# Patient Record
Sex: MALE | NOT HISPANIC OR LATINO | URBAN - METROPOLITAN AREA
[De-identification: names, ages, dates, MRNs, and addresses within clinical notes are randomized per-mention and may not be internally consistent; named-entity substitution may affect disease eponyms.]

---

## 2023-08-26 PROBLEM — L40.9 PSORIASIS: Status: ACTIVE | Noted: 2023-08-26

## 2023-08-26 PROBLEM — B34.2 CORONAVIRUS INFECTION: Status: ACTIVE | Noted: 2023-08-26

## 2023-08-26 PROBLEM — J96.00 ACUTE RESPIRATORY FAILURE (MULTI): Status: ACTIVE | Noted: 2023-08-26

## 2023-08-26 PROBLEM — Z86.711 HISTORY OF PULMONARY EMBOLISM: Status: ACTIVE | Noted: 2023-08-26

## 2023-08-26 PROBLEM — G47.00 INSOMNIA: Status: ACTIVE | Noted: 2023-08-26

## 2023-08-26 PROBLEM — I71.40 ABDOMINAL AORTIC ANEURYSM (CMS-HCC): Status: ACTIVE | Noted: 2023-08-26

## 2023-08-26 PROBLEM — M47.816 OSTEOARTHRITIS OF LUMBAR SPINE: Status: ACTIVE | Noted: 2023-08-26

## 2023-08-26 PROBLEM — E86.0 DEHYDRATION: Status: ACTIVE | Noted: 2023-08-26

## 2023-08-26 PROBLEM — E11.9 TYPE 2 DIABETES MELLITUS WITHOUT COMPLICATION, WITHOUT LONG-TERM CURRENT USE OF INSULIN (MULTI): Status: ACTIVE | Noted: 2023-08-26

## 2023-08-26 PROBLEM — M79.676 PAIN IN TOE: Status: ACTIVE | Noted: 2023-08-26

## 2023-08-26 PROBLEM — J18.9 PNEUMONIA: Status: ACTIVE | Noted: 2023-08-26

## 2023-08-26 PROBLEM — I49.9 IRREGULAR HEARTBEAT: Status: ACTIVE | Noted: 2023-08-26

## 2023-08-26 PROBLEM — R51.9 HEADACHE: Status: ACTIVE | Noted: 2023-08-26

## 2023-08-26 PROBLEM — R04.2 HEMOPTYSIS: Status: ACTIVE | Noted: 2023-08-26

## 2023-08-26 PROBLEM — R06.00 DYSPNEA: Status: ACTIVE | Noted: 2023-08-26

## 2023-08-26 PROBLEM — M48.061 FORAMINAL STENOSIS OF LUMBAR REGION: Status: ACTIVE | Noted: 2023-08-26

## 2023-08-26 PROBLEM — R73.09 BLOOD GLUCOSE ABNORMAL: Status: ACTIVE | Noted: 2023-08-26

## 2023-08-26 PROBLEM — I48.0 PAROXYSMAL ATRIAL FIBRILLATION (MULTI): Status: ACTIVE | Noted: 2023-08-26

## 2023-08-26 PROBLEM — R09.02 HYPOXIA: Status: ACTIVE | Noted: 2023-08-26

## 2023-08-26 PROBLEM — L08.9 INFECTION OF TOE: Status: ACTIVE | Noted: 2023-08-26

## 2023-08-26 PROBLEM — S39.012A BACK STRAIN: Status: ACTIVE | Noted: 2023-08-26

## 2023-08-26 RX ORDER — HYDROCODONE BITARTRATE AND ACETAMINOPHEN 5; 325 MG/1; MG/1
TABLET ORAL
COMMUNITY
Start: 2023-07-14

## 2023-08-26 RX ORDER — ASPIRIN 325 MG
TABLET, DELAYED RELEASE (ENTERIC COATED) ORAL
COMMUNITY

## 2023-10-27 ENCOUNTER — APPOINTMENT (OUTPATIENT)
Dept: PRIMARY CARE | Facility: CLINIC | Age: 73
End: 2023-10-27
Payer: MEDICARE

## 2023-11-13 ENCOUNTER — TELEPHONE (OUTPATIENT)
Dept: PRIMARY CARE | Facility: CLINIC | Age: 73
End: 2023-11-13
Payer: MEDICARE

## 2023-11-13 DIAGNOSIS — E11.9 TYPE 2 DIABETES MELLITUS WITHOUT COMPLICATION, WITHOUT LONG-TERM CURRENT USE OF INSULIN (MULTI): Primary | ICD-10-CM

## 2023-11-13 RX ORDER — BLOOD-GLUCOSE METER
EACH MISCELLANEOUS
COMMUNITY
Start: 2023-03-17

## 2023-11-13 RX ORDER — BLOOD SUGAR DIAGNOSTIC
STRIP MISCELLANEOUS
Qty: 100 EACH | Refills: 3 | Status: SHIPPED | OUTPATIENT
Start: 2023-11-13

## 2023-11-13 RX ORDER — FLUOCINONIDE 0.5 MG/G
CREAM TOPICAL
COMMUNITY
Start: 2019-03-06

## 2023-11-13 RX ORDER — BLOOD SUGAR DIAGNOSTIC
STRIP MISCELLANEOUS
COMMUNITY
Start: 2023-10-12 | End: 2023-11-13 | Stop reason: SDUPTHER

## 2023-12-20 ENCOUNTER — TELEPHONE (OUTPATIENT)
Dept: PRIMARY CARE | Facility: CLINIC | Age: 73
End: 2023-12-20
Payer: MEDICARE

## 2023-12-20 DIAGNOSIS — M48.061 FORAMINAL STENOSIS OF LUMBAR REGION: Primary | ICD-10-CM

## 2024-10-22 ENCOUNTER — TELEPHONE (OUTPATIENT)
Dept: PRIMARY CARE | Facility: CLINIC | Age: 74
End: 2024-10-22
Payer: MEDICARE

## 2024-10-22 NOTE — TELEPHONE ENCOUNTER
Pt is requesting Lump Left side below ear at the corner of the jaw bone been there since May, Please Advise  [phone].

## 2024-11-02 PROBLEM — L08.9 INFECTION OF TOE: Status: RESOLVED | Noted: 2023-08-26 | Resolved: 2024-11-02

## 2024-11-02 PROBLEM — M86.8X7 OSTEOMYELITIS OF FOREFOOT (MULTI): Status: RESOLVED | Noted: 2023-07-12 | Resolved: 2024-11-02

## 2024-11-02 PROBLEM — B34.2 CORONAVIRUS INFECTION: Status: RESOLVED | Noted: 2023-08-26 | Resolved: 2024-11-02

## 2024-11-02 PROBLEM — R06.00 DYSPNEA: Status: RESOLVED | Noted: 2023-08-26 | Resolved: 2024-11-02

## 2024-11-02 PROBLEM — J96.00 ACUTE RESPIRATORY FAILURE: Status: RESOLVED | Noted: 2023-08-26 | Resolved: 2024-11-02

## 2024-11-02 PROBLEM — I49.9 IRREGULAR HEARTBEAT: Status: RESOLVED | Noted: 2023-08-26 | Resolved: 2024-11-02

## 2024-11-02 PROBLEM — J18.9 PNEUMONIA: Status: RESOLVED | Noted: 2023-08-26 | Resolved: 2024-11-02

## 2024-11-02 PROBLEM — R73.09 BLOOD GLUCOSE ABNORMAL: Status: RESOLVED | Noted: 2023-08-26 | Resolved: 2024-11-02

## 2024-11-02 PROBLEM — R51.9 HEADACHE: Status: RESOLVED | Noted: 2023-08-26 | Resolved: 2024-11-02

## 2024-11-02 PROBLEM — R04.2 HEMOPTYSIS: Status: RESOLVED | Noted: 2023-08-26 | Resolved: 2024-11-02

## 2024-11-02 PROBLEM — S39.012A BACK STRAIN: Status: RESOLVED | Noted: 2023-08-26 | Resolved: 2024-11-02

## 2024-11-02 PROBLEM — E86.0 DEHYDRATION: Status: RESOLVED | Noted: 2023-08-26 | Resolved: 2024-11-02

## 2024-11-02 PROBLEM — M79.676 PAIN IN TOE: Status: RESOLVED | Noted: 2023-08-26 | Resolved: 2024-11-02

## 2024-11-02 PROBLEM — R09.02 HYPOXIA: Status: RESOLVED | Noted: 2023-08-26 | Resolved: 2024-11-02

## 2024-11-05 ENCOUNTER — OFFICE VISIT (OUTPATIENT)
Dept: PRIMARY CARE | Facility: CLINIC | Age: 74
End: 2024-11-05
Payer: MEDICARE

## 2024-11-05 VITALS
BODY MASS INDEX: 24.32 KG/M2 | TEMPERATURE: 96.5 F | DIASTOLIC BLOOD PRESSURE: 78 MMHG | WEIGHT: 164.7 LBS | RESPIRATION RATE: 18 BRPM | SYSTOLIC BLOOD PRESSURE: 108 MMHG | OXYGEN SATURATION: 97 % | HEART RATE: 80 BPM

## 2024-11-05 DIAGNOSIS — K11.8 MASS OF LEFT PAROTID GLAND: Primary | ICD-10-CM

## 2024-11-05 PROCEDURE — 99213 OFFICE O/P EST LOW 20 MIN: CPT | Performed by: FAMILY MEDICINE

## 2024-11-05 PROCEDURE — 3074F SYST BP LT 130 MM HG: CPT | Performed by: FAMILY MEDICINE

## 2024-11-05 PROCEDURE — 1159F MED LIST DOCD IN RCRD: CPT | Performed by: FAMILY MEDICINE

## 2024-11-05 PROCEDURE — G2211 COMPLEX E/M VISIT ADD ON: HCPCS | Performed by: FAMILY MEDICINE

## 2024-11-05 PROCEDURE — 1036F TOBACCO NON-USER: CPT | Performed by: FAMILY MEDICINE

## 2024-11-05 PROCEDURE — 1160F RVW MEDS BY RX/DR IN RCRD: CPT | Performed by: FAMILY MEDICINE

## 2024-11-05 PROCEDURE — 3078F DIAST BP <80 MM HG: CPT | Performed by: FAMILY MEDICINE

## 2024-11-05 PROCEDURE — 1124F ACP DISCUSS-NO DSCNMKR DOCD: CPT | Performed by: FAMILY MEDICINE

## 2024-11-05 ASSESSMENT — LIFESTYLE VARIABLES
HOW OFTEN DO YOU HAVE A DRINK CONTAINING ALCOHOL: NEVER
SKIP TO QUESTIONS 9-10: 1
HOW MANY STANDARD DRINKS CONTAINING ALCOHOL DO YOU HAVE ON A TYPICAL DAY: PATIENT DOES NOT DRINK
HOW OFTEN DO YOU HAVE SIX OR MORE DRINKS ON ONE OCCASION: NEVER
AUDIT-C TOTAL SCORE: 0

## 2024-11-05 ASSESSMENT — ENCOUNTER SYMPTOMS
DEPRESSION: 0
OCCASIONAL FEELINGS OF UNSTEADINESS: 0
LOSS OF SENSATION IN FEET: 0

## 2024-11-05 ASSESSMENT — COLUMBIA-SUICIDE SEVERITY RATING SCALE - C-SSRS
6. HAVE YOU EVER DONE ANYTHING, STARTED TO DO ANYTHING, OR PREPARED TO DO ANYTHING TO END YOUR LIFE?: NO
1. IN THE PAST MONTH, HAVE YOU WISHED YOU WERE DEAD OR WISHED YOU COULD GO TO SLEEP AND NOT WAKE UP?: NO
2. HAVE YOU ACTUALLY HAD ANY THOUGHTS OF KILLING YOURSELF?: NO

## 2024-11-05 ASSESSMENT — PATIENT HEALTH QUESTIONNAIRE - PHQ9
SUM OF ALL RESPONSES TO PHQ9 QUESTIONS 1 & 2: 0
1. LITTLE INTEREST OR PLEASURE IN DOING THINGS: NOT AT ALL
2. FEELING DOWN, DEPRESSED OR HOPELESS: NOT AT ALL

## 2024-11-05 NOTE — PATIENT INSTRUCTIONS
Mass in left parotid gland - discussed causes.  Concern for cyst vs mass.  Recommend ultrasound of parotid.  Further testing depends on result.  Will likely need ENT referral after testing.

## 2024-11-05 NOTE — PROGRESS NOTES
Subjective   Patient ID: Clyde Sampson Jr. is a 74 y.o. male who presents for lump left neck/ear.    Patient is here for lump on left side of face.  Woke up with lump.  Present since May 2025.  Unchanged.  No pain.  No hearing loss.  No pain with salivating.  No trouble swallowing.         Review of Systems    Objective   /78   Pulse 80   Temp 35.8 °C (96.5 °F) (Temporal)   Resp 18   Wt 74.7 kg (164 lb 11.2 oz)   SpO2 97%   PF 80 L/min   BMI 24.32 kg/m²     Physical Exam  Vitals reviewed.   Constitutional:       General: He is not in acute distress.  HENT:      Head:      Salivary Glands: Left salivary gland is diffusely enlarged. Left salivary gland is not tender.      Right Ear: Tympanic membrane and ear canal normal.      Left Ear: Tympanic membrane and ear canal normal.      Nose: No rhinorrhea.      Mouth/Throat:      Pharynx: Oropharynx is clear. Uvula midline. No posterior oropharyngeal erythema.   Cardiovascular:      Rate and Rhythm: Normal rate and regular rhythm.   Pulmonary:      Effort: Pulmonary effort is normal.      Breath sounds: No wheezing or rhonchi.   Musculoskeletal:      Right lower leg: No edema.      Left lower leg: No edema.   Lymphadenopathy:      Cervical: No cervical adenopathy.   Neurological:      Mental Status: He is alert.         Assessment/Plan   Diagnoses and all orders for this visit:  Mass of left parotid gland  -     US neck parotid; Future    Patient Instructions   Mass in left parotid gland - discussed causes.  Concern for cyst vs mass.  Recommend ultrasound of parotid.  Further testing depends on result.  Will likely need ENT referral after testing.

## 2024-11-07 ENCOUNTER — HOSPITAL ENCOUNTER (OUTPATIENT)
Dept: RADIOLOGY | Facility: HOSPITAL | Age: 74
Discharge: HOME | End: 2024-11-07
Payer: MEDICARE

## 2024-11-07 DIAGNOSIS — K11.8 MASS OF LEFT PAROTID GLAND: ICD-10-CM

## 2024-11-07 PROCEDURE — 76536 US EXAM OF HEAD AND NECK: CPT

## 2024-11-07 PROCEDURE — 76536 US EXAM OF HEAD AND NECK: CPT | Performed by: RADIOLOGY

## 2024-11-08 ENCOUNTER — TELEPHONE (OUTPATIENT)
Dept: PRIMARY CARE | Facility: CLINIC | Age: 74
End: 2024-11-08
Payer: MEDICARE

## 2024-11-08 DIAGNOSIS — K11.8 MASS OF LEFT PAROTID GLAND: Primary | ICD-10-CM

## 2024-11-08 NOTE — TELEPHONE ENCOUNTER
Please contact patient about US results. Had it done yesterday and was told he would hear from Dr. Corbin today.  Explained that Dr. Corbin is on vacation.  Thank you

## 2024-11-11 NOTE — TELEPHONE ENCOUNTER
Spoke with patient about results.  Patient is going to call back for referral information later today.

## 2024-11-11 NOTE — TELEPHONE ENCOUNTER
On US patient has a solid large left parotid mass.  Pt needs to see ENT.  Please call Dr. Lynch office to help arrange follow up.  Pt will need biopsy and likely removal.

## 2024-11-11 NOTE — TELEPHONE ENCOUNTER
Clyde called to schedule the referral with Dr Lynch and was told he cannot be seen until 2/12/25 but that they are going to try as hard as they can to get him in sooner.

## 2024-11-12 ENCOUNTER — TELEPHONE (OUTPATIENT)
Dept: PRIMARY CARE | Facility: CLINIC | Age: 74
End: 2024-11-12
Payer: MEDICARE

## 2024-11-12 NOTE — TELEPHONE ENCOUNTER
Dr. Steinberg is not able to see him until 02/12/25 and he is concerned about the lump on his face. He said if you can refer him to someone that can see him sooner he would appreciate that.

## 2024-11-12 NOTE — TELEPHONE ENCOUNTER
Spoke with Sandra and she has patient rescheduled for December 4. She had notified the patient already.  MILY

## 2024-12-04 ENCOUNTER — LAB (OUTPATIENT)
Dept: LAB | Facility: LAB | Age: 74
End: 2024-12-04
Payer: MEDICARE

## 2024-12-04 ENCOUNTER — APPOINTMENT (OUTPATIENT)
Dept: OTOLARYNGOLOGY | Facility: CLINIC | Age: 74
End: 2024-12-04
Payer: MEDICARE

## 2024-12-04 VITALS — WEIGHT: 175 LBS | BODY MASS INDEX: 25.92 KG/M2 | HEIGHT: 69 IN

## 2024-12-04 DIAGNOSIS — K11.8 MASS OF LEFT PAROTID GLAND: Primary | ICD-10-CM

## 2024-12-04 DIAGNOSIS — Z00.00 HEALTH CARE MAINTENANCE: ICD-10-CM

## 2024-12-04 DIAGNOSIS — R22.1 NECK MASS: ICD-10-CM

## 2024-12-04 LAB
CREAT SERPL-MCNC: 1.01 MG/DL (ref 0.5–1.3)
EGFRCR SERPLBLD CKD-EPI 2021: 79 ML/MIN/1.73M*2

## 2024-12-04 PROCEDURE — 88112 CYTOPATH CELL ENHANCE TECH: CPT

## 2024-12-04 PROCEDURE — 1160F RVW MEDS BY RX/DR IN RCRD: CPT | Performed by: OTOLARYNGOLOGY

## 2024-12-04 PROCEDURE — 1036F TOBACCO NON-USER: CPT | Performed by: OTOLARYNGOLOGY

## 2024-12-04 PROCEDURE — 88173 CYTOPATH EVAL FNA REPORT: CPT

## 2024-12-04 PROCEDURE — 3008F BODY MASS INDEX DOCD: CPT | Performed by: OTOLARYNGOLOGY

## 2024-12-04 PROCEDURE — 10021 FNA BX W/O IMG GDN 1ST LES: CPT | Performed by: OTOLARYNGOLOGY

## 2024-12-04 PROCEDURE — 1159F MED LIST DOCD IN RCRD: CPT | Performed by: OTOLARYNGOLOGY

## 2024-12-04 PROCEDURE — 31575 DIAGNOSTIC LARYNGOSCOPY: CPT | Performed by: OTOLARYNGOLOGY

## 2024-12-04 PROCEDURE — 82565 ASSAY OF CREATININE: CPT

## 2024-12-04 PROCEDURE — 88173 CYTOPATH EVAL FNA REPORT: CPT | Performed by: PATHOLOGY

## 2024-12-04 PROCEDURE — 36415 COLL VENOUS BLD VENIPUNCTURE: CPT

## 2024-12-04 PROCEDURE — 99203 OFFICE O/P NEW LOW 30 MIN: CPT | Performed by: OTOLARYNGOLOGY

## 2024-12-04 NOTE — PROGRESS NOTES
"HPI  Clyde Sampson Jr. is a 74 y.o. male kindly referred by Dr. Corbin for ration of left parotid mass.  Patient noticed about May and it is slowly grown since.  Ultrasound with about a 4 cm mass in the inferior parotid gland.  Vascular nature.  It is not painful.  He does not have other neck masses.  He has not had biopsy.      Past Medical History:   Diagnosis Date    Osteomyelitis of forefoot (Multi) 07/12/2023            Medications:     Current Outpatient Medications:     Accu-Chek Guide Glucose Meter misc, USE AS DIRECTED TO TEST TWICE DAILY, Disp: , Rfl:     Accu-Chek Guide test strips strip, USE TO TEST TWICE DAILY AS DIRECTED, Disp: 100 each, Rfl: 3    aspirin 325 mg EC tablet, 1 tablet Orally twice daily, Disp: , Rfl:      Allergies:  Allergies   Allergen Reactions    Penicillin V Hives    Latex, Natural Rubber Rash    Penicillin Rash        Physical Exam:  Last Recorded Vitals  Height 1.753 m (5' 9\"), weight 79.4 kg (175 lb).  General:     General appearance: Well-developed, well-nourished in no acute distress.       Voice:  normal       Head/face: Normal appearance; nontender to palpation     Facial nerve function: Normal and symmetric bilaterally.    Oral/oropharynx:     Oral vestibule: Normal labial and gingival mucosa     Tongue/floor of mouth: Normal without lesion     Oropharynx: Clear.  No lesions present of the hard/soft palate, posterior pharynx    Neck:     Neck: Normal appearance, trachea midline     Salivary glands: Normal to palpation right.  Left side with a 3 cm inferior posterior mass, mobile, rubbery, no skin changes     Lymph nodes: No cervical lymphadenopathy to palpation     Thyroid: No thyromegaly.  No palpable nodules     Range of motion: Normal    Neurological:     Cortical functions: Alert and oriented x3, appropriate affect       Larynx/hypopharynx:     Laryngeal findings: Mirror exam inadequate or limited secondary to enlarged base of tongue and/or excessive gagging.  Flexible " laryngoscopy performed secondary to inadequate mirror examination.  Verbal informed consent the flexible laryngoscope was passed through the nasal cavity.  Normal epiglottis, aryepiglottic folds, arytenoids, false vocal cords, true vocal cords.  Normal vocal cord mobility bilaterally was identified.  No mucosal masses or lesions.    Nasopharynx:     Nasopharynx: Normal    Ear:     Ear canal: Normal bilaterally after cleaning cerumen impaction bilaterally with wire-loop.     Tympanic membrane: Intact and mobile bilaterally     Pinna: Normal bilaterally     Hearing:  Gross hearing assessment normal by voice    Nose:     Visualized using: Anterior rhinoscopy     Nasopharynx: Inadequate mirror exam secondary to gag, anatomy.       Nasal dorsum: Nontraumatic midline appearance     Septum: Midline     Inferior turbinates: Normally sized     Mucosa: Bilateral, pink, normal appearing       ASSESSMENT/PLAN:  Left parotid mass.  FNA performed today x 2 with verbal informed consent.  Tolerated well.  CT neck ordered.  Follow-up after        Randy Lynch MD

## 2024-12-09 LAB
LABORATORY COMMENT REPORT: NORMAL
LABORATORY COMMENT REPORT: NORMAL
PATH REPORT.FINAL DX SPEC: NORMAL
PATH REPORT.GROSS SPEC: NORMAL
PATH REPORT.RELEVANT HX SPEC: NORMAL
PATH REPORT.TOTAL CANCER: NORMAL

## 2024-12-16 ENCOUNTER — APPOINTMENT (OUTPATIENT)
Dept: OTOLARYNGOLOGY | Facility: CLINIC | Age: 74
End: 2024-12-16
Payer: MEDICARE

## 2024-12-19 ENCOUNTER — HOSPITAL ENCOUNTER (OUTPATIENT)
Dept: RADIOLOGY | Facility: HOSPITAL | Age: 74
Discharge: HOME | End: 2024-12-19
Payer: MEDICARE

## 2024-12-19 DIAGNOSIS — K11.8 MASS OF LEFT PAROTID GLAND: ICD-10-CM

## 2024-12-19 PROCEDURE — 70491 CT SOFT TISSUE NECK W/DYE: CPT | Performed by: RADIOLOGY

## 2024-12-19 PROCEDURE — 70491 CT SOFT TISSUE NECK W/DYE: CPT

## 2024-12-19 PROCEDURE — 2550000001 HC RX 255 CONTRASTS: Performed by: OTOLARYNGOLOGY

## 2024-12-20 ENCOUNTER — APPOINTMENT (OUTPATIENT)
Dept: OTOLARYNGOLOGY | Facility: CLINIC | Age: 74
End: 2024-12-20
Payer: MEDICARE

## 2024-12-20 VITALS — TEMPERATURE: 97.5 F | BODY MASS INDEX: 25.8 KG/M2 | HEIGHT: 69 IN | WEIGHT: 174.2 LBS

## 2024-12-20 DIAGNOSIS — R22.1 NECK MASS: ICD-10-CM

## 2024-12-20 DIAGNOSIS — K11.8 MASS OF LEFT PAROTID GLAND: Primary | ICD-10-CM

## 2024-12-20 PROCEDURE — 99214 OFFICE O/P EST MOD 30 MIN: CPT | Performed by: OTOLARYNGOLOGY

## 2024-12-20 PROCEDURE — 1036F TOBACCO NON-USER: CPT | Performed by: OTOLARYNGOLOGY

## 2024-12-20 PROCEDURE — 3008F BODY MASS INDEX DOCD: CPT | Performed by: OTOLARYNGOLOGY

## 2024-12-20 PROCEDURE — 1159F MED LIST DOCD IN RCRD: CPT | Performed by: OTOLARYNGOLOGY

## 2024-12-20 NOTE — PROGRESS NOTES
"HPI  Clyde Sampson Jr. is a 74 y.o. male follow-up left parotid mass.  CT scan with what appears to be mass that extends into the deep lobe and medial to the ramus of the mandible.  FNA consistent with Warthin tumor    Past Medical History:   Diagnosis Date    Osteomyelitis of forefoot (Multi) 07/12/2023            Medications:     Current Outpatient Medications:     Accu-Chek Guide Glucose Meter misc, USE AS DIRECTED TO TEST TWICE DAILY, Disp: , Rfl:     Accu-Chek Guide test strips strip, USE TO TEST TWICE DAILY AS DIRECTED, Disp: 100 each, Rfl: 3    aspirin 325 mg EC tablet, 1 tablet Orally twice daily, Disp: , Rfl:      Allergies:  Allergies   Allergen Reactions    Penicillin V Hives    Latex, Natural Rubber Rash    Penicillin Rash        Physical Exam:  Last Recorded Vitals  Temperature 36.4 °C (97.5 °F), height 1.753 m (5' 9\"), weight 79 kg (174 lb 3.2 oz).  General:     General appearance: Well-developed, well-nourished in no acute distress.       Voice:  normal       Head/face: Normal appearance; nontender to palpation     Facial nerve function: Normal and symmetric bilaterally.    Oral/oropharynx:     Oral vestibule: Normal labial and gingival mucosa     Tongue/floor of mouth: Normal without lesion     Oropharynx: Clear.  No lesions present of the hard/soft palate, posterior pharynx    Neck:     Neck: Normal appearance, trachea midline     Salivary glands: Normal to palpation right.  Left side again with a 3 cm inferior posterior mass, mobile, rubbery, no skin changes     Lymph nodes: No cervical lymphadenopathy to palpation     Thyroid: No thyromegaly.  No palpable nodules     Range of motion: Normal    Neurological:     Cortical functions: Alert and oriented x3, appropriate affect       Larynx/hypopharynx:     Laryngeal findings: Mirror exam inadequate or limited secondary to enlarged base of tongue and/or excessive gagging.     Ear:     Ear canal: Normal bilaterally     Tympanic membrane: Intact and " mobile bilaterally     Pinna: Normal bilaterally     Hearing:  Gross hearing assessment normal by voice    Nose:     Visualized using: Anterior rhinoscopy     Nasopharynx: Inadequate mirror exam secondary to gag, anatomy.       Nasal dorsum: Nontraumatic midline appearance     Septum: Midline     Inferior turbinates: Normally sized     Mucosa: Bilateral, pink, normal appearing       ASSESSMENT/PLAN:  Left parotid mass.  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  We discussed the size and location of this and I have referred him for surgical opinion regarding removal from my head/neck colleagues.  I am happy to see him back at any point.      Randy Lynch MD

## 2024-12-23 ENCOUNTER — TELEPHONE (OUTPATIENT)
Dept: PRIMARY CARE | Facility: CLINIC | Age: 74
End: 2024-12-23
Payer: MEDICARE

## 2024-12-23 ENCOUNTER — TELEPHONE (OUTPATIENT)
Dept: OTOLARYNGOLOGY | Facility: CLINIC | Age: 74
End: 2024-12-23
Payer: MEDICARE

## 2024-12-23 DIAGNOSIS — E11.9 TYPE 2 DIABETES MELLITUS WITHOUT COMPLICATION, WITHOUT LONG-TERM CURRENT USE OF INSULIN (MULTI): ICD-10-CM

## 2024-12-23 RX ORDER — BLOOD SUGAR DIAGNOSTIC
STRIP MISCELLANEOUS
Qty: 100 EACH | Refills: 3 | Status: SHIPPED | OUTPATIENT
Start: 2024-12-23

## 2024-12-23 NOTE — TELEPHONE ENCOUNTER
Patient was last treated in the office on Friday for a left mass parotid mass and review CT scan/FNA results. He called today in regards to being referred for surgery, he did mention he would like to stay in [facility] if possible. He asked for a call back to discuss further at [phone], thank you.

## 2024-12-23 NOTE — TELEPHONE ENCOUNTER
Refill Request:    Accu-Chek Guide test strips strip USE TO TEST TWICE DAILY AS DIRECTED     Qty # 100    Pharmacy: [facility]

## 2025-01-06 ENCOUNTER — APPOINTMENT (OUTPATIENT)
Dept: OTOLARYNGOLOGY | Facility: CLINIC | Age: 75
End: 2025-01-06
Payer: MEDICARE

## 2025-01-06 VITALS — TEMPERATURE: 97.4 F | HEIGHT: 69 IN | BODY MASS INDEX: 25.77 KG/M2 | WEIGHT: 174 LBS

## 2025-01-06 DIAGNOSIS — K11.8 MASS OF BOTH PAROTID GLANDS: ICD-10-CM

## 2025-01-06 DIAGNOSIS — D11.9 WARTHIN TUMOR: Primary | ICD-10-CM

## 2025-01-06 PROCEDURE — 1036F TOBACCO NON-USER: CPT | Performed by: STUDENT IN AN ORGANIZED HEALTH CARE EDUCATION/TRAINING PROGRAM

## 2025-01-06 PROCEDURE — 3008F BODY MASS INDEX DOCD: CPT | Performed by: STUDENT IN AN ORGANIZED HEALTH CARE EDUCATION/TRAINING PROGRAM

## 2025-01-06 PROCEDURE — 99204 OFFICE O/P NEW MOD 45 MIN: CPT | Performed by: STUDENT IN AN ORGANIZED HEALTH CARE EDUCATION/TRAINING PROGRAM

## 2025-01-06 PROCEDURE — 1159F MED LIST DOCD IN RCRD: CPT | Performed by: STUDENT IN AN ORGANIZED HEALTH CARE EDUCATION/TRAINING PROGRAM

## 2025-01-06 PROCEDURE — 1126F AMNT PAIN NOTED NONE PRSNT: CPT | Performed by: STUDENT IN AN ORGANIZED HEALTH CARE EDUCATION/TRAINING PROGRAM

## 2025-01-06 ASSESSMENT — PAIN SCALES - GENERAL: PAINLEVEL_OUTOF10: 0-NO PAIN

## 2025-01-06 NOTE — PROGRESS NOTES
"HEAD AND NECK SURGERY CONSULT  [facility]      HPI  I had the pleasure of seeing Clyde WILLARD Camilla Montoya as a consultation today for evaluation of parotid lesion.     The patient reports a history of noticing enlargement of his left neck in April or May of last year. It has not grown since then. No pain or tenderness. No numbness or changes in facial movements. A biopsy was done and came back as warthins tumor. He would like to have this removed.    Denies history of skin cancers or lesions. History of smoking.    The patient denies having prior trauma or surgery to their head and neck. There is not a personal or family history of blood clots, easy bleeding or bruising, or anesthesia concerns.      Tobacco use: The patient  reports that he quit smoking about 5 years ago. His smoking use included cigarettes. He has never used smokeless tobacco.   Alcohol Use: The patient  reports that he does not currently use alcohol.     Physical Examination  Vitals:  Temp 36.3 °C (97.4 °F) (Tympanic)   Ht 1.753 m (5' 9\")   Wt 78.9 kg (174 lb)   BMI 25.70 kg/m²   General: Examination reveals a well-developed, well-nourished patient in no apparent distress.  The patient has no audible dysphonia, stridor or airway distress.  The patient is oriented, alert and responsive.    Face: symmetric movement, no lesions  Oral Cavity:  The patient is able to open the mouth widely without trismus.  The floor of mouth and oral tongue are soft, and no mucosal abnormalities are noted on the lips or within the oral cavity.  The oral tongue is fully mobile and midline on protrusion.  The patient's teeth are poor with multiple missing  Oropharynx: There are no mucosal abnormalities noted within the oropharynx.  The soft palate elevates symmetrically, the tonsils and base of tongue are normal to inspection and palpation.       Salivary glands: Palpable, mobile left mass at tail of parotid. No other masses palpable  Neuro: Cranial nerves " 2-12 are without obvious abnormality.  Neck: The neck is soft and symmetric.  There is no palpable adenopathy.      DATA REVIEWED:  Labs:  Lab Results   Component Value Date    WBC 9.9 05/31/2023    HGB 16.7 (H) 05/31/2023    HCT 48.3 05/31/2023     05/31/2023    NEUTROABS 7.59 05/31/2023     Lab Results   Component Value Date    TSH 1.76 10/24/2021    HGBA1C 6.3 (H) 07/12/2023        Radiology: I personally reviewed the CT neck from 12/19/24 which showed a mass of the left parotid gland measuring greater than 4 cm, additional smaller 1 cm lesion in the right parotid gland, no cervical LAD    Review of prior medical records: I reviewed the patient's medical records which included a clinic note from Randy Lynch MD (ENT) from 12/20/24 in which he detailed work up of left parotid mass found to be a warthins tumor and referral to Head and neck to consider surgical excision.    Pathology: I reviewed the pathology report from the biopsy 12/4/24 which demonstrated FNA of left neck mass, consistent with warthins tumor     ASSESSMENT and PLAN:    Bilateral parotid mass,   - Discussed the larger left parotid mass biopsy consistent with warthins tumor. Suspect the smaller right parotid mass is also a warthins  - Reviewed options for next steps for the left warthins tumor including observation v surgical excision  - Discussed parotidectomy risks and benefits at length including pain, bleeding, infection, scar, hematoma or seroma, need for further procedures, damage to surrounding structures including the facial nerve, first bite syndrome, Soanm's syndrome and risks of anesthesia.   - Patient desires surgical excision, discussed possible OR dates/locations and he does not want to come downtown to [facility] where I operate. Will reach out [place] one of [place]     Fabby Mullen MD

## 2025-01-31 ENCOUNTER — APPOINTMENT (OUTPATIENT)
Dept: OTOLARYNGOLOGY | Facility: CLINIC | Age: 75
End: 2025-01-31
Payer: MEDICARE

## 2025-02-07 ENCOUNTER — OFFICE VISIT (OUTPATIENT)
Dept: PRIMARY CARE | Facility: CLINIC | Age: 75
End: 2025-02-07
Payer: MEDICARE

## 2025-02-07 VITALS
OXYGEN SATURATION: 98 % | TEMPERATURE: 96.9 F | SYSTOLIC BLOOD PRESSURE: 156 MMHG | WEIGHT: 173.4 LBS | DIASTOLIC BLOOD PRESSURE: 86 MMHG | RESPIRATION RATE: 18 BRPM | BODY MASS INDEX: 25.61 KG/M2 | HEART RATE: 68 BPM

## 2025-02-07 DIAGNOSIS — D11.9 WARTHIN TUMOR: Primary | ICD-10-CM

## 2025-02-07 DIAGNOSIS — I48.0 PAROXYSMAL ATRIAL FIBRILLATION (MULTI): ICD-10-CM

## 2025-02-07 DIAGNOSIS — E11.9 TYPE 2 DIABETES MELLITUS WITHOUT COMPLICATION, WITHOUT LONG-TERM CURRENT USE OF INSULIN (MULTI): ICD-10-CM

## 2025-02-07 LAB — POC HEMOGLOBIN A1C: 5.8 % (ref 4.2–6.5)

## 2025-02-07 PROCEDURE — 99214 OFFICE O/P EST MOD 30 MIN: CPT | Performed by: FAMILY MEDICINE

## 2025-02-07 PROCEDURE — 83036 HEMOGLOBIN GLYCOSYLATED A1C: CPT | Mod: QW | Performed by: FAMILY MEDICINE

## 2025-02-07 PROCEDURE — 99214 OFFICE O/P EST MOD 30 MIN: CPT | Mod: 25 | Performed by: FAMILY MEDICINE

## 2025-02-07 PROCEDURE — 1125F AMNT PAIN NOTED PAIN PRSNT: CPT | Performed by: FAMILY MEDICINE

## 2025-02-07 PROCEDURE — 3077F SYST BP >= 140 MM HG: CPT | Performed by: FAMILY MEDICINE

## 2025-02-07 PROCEDURE — 1160F RVW MEDS BY RX/DR IN RCRD: CPT | Performed by: FAMILY MEDICINE

## 2025-02-07 PROCEDURE — 1159F MED LIST DOCD IN RCRD: CPT | Performed by: FAMILY MEDICINE

## 2025-02-07 PROCEDURE — 3079F DIAST BP 80-89 MM HG: CPT | Performed by: FAMILY MEDICINE

## 2025-02-07 ASSESSMENT — ENCOUNTER SYMPTOMS
OCCASIONAL FEELINGS OF UNSTEADINESS: 0
DEPRESSION: 0
LOSS OF SENSATION IN FEET: 0

## 2025-02-07 ASSESSMENT — LIFESTYLE VARIABLES
SKIP TO QUESTIONS 9-10: 1
HOW OFTEN DO YOU HAVE SIX OR MORE DRINKS ON ONE OCCASION: NEVER
HOW MANY STANDARD DRINKS CONTAINING ALCOHOL DO YOU HAVE ON A TYPICAL DAY: PATIENT DOES NOT DRINK
AUDIT-C TOTAL SCORE: 0
HOW OFTEN DO YOU HAVE A DRINK CONTAINING ALCOHOL: NEVER

## 2025-02-07 ASSESSMENT — PATIENT HEALTH QUESTIONNAIRE - PHQ9
1. LITTLE INTEREST OR PLEASURE IN DOING THINGS: NOT AT ALL
2. FEELING DOWN, DEPRESSED OR HOPELESS: NOT AT ALL
SUM OF ALL RESPONSES TO PHQ9 QUESTIONS 1 & 2: 0

## 2025-02-07 ASSESSMENT — PAIN SCALES - GENERAL: PAINLEVEL_OUTOF10: 5

## 2025-02-07 NOTE — PROGRESS NOTES
Subjective   Patient ID: Clyde Sampson Jr. is a 74 y.o. male who presents for Diabetes.    For DM2:  -A1c: 5.8  -Follow up: diet controlled.  Feeling well.    -Hypoglycemic Episodes: none  -Glucose monitoring:  not checking    Parotid mass  -Pt saw ENT - pt had biopsy - positive for warthins tumor 12/2024.  Saw Dr. Stern - offered surgery.  Pt does not want to do [facility] - too far.  Patient was referred to Dr. Galvan at  [facility].    -Specialist: Dr. Lynch    Atrial Fibrillation  -F/U: stable, no issues - no palpitations, CP or SOB  -Anticoagulation: pt refused anticoagulation  -Cardiologist:  none      Diabetes         Review of Systems    Objective   /86   Pulse 68   Temp 36.1 °C (96.9 °F) (Temporal)   Resp 18   Wt 78.7 kg (173 lb 6.4 oz)   SpO2 98%   BMI 25.61 kg/m²     Physical Exam  Vitals reviewed.   Constitutional:       General: He is not in acute distress.  HENT:      Head:      Salivary Glands: Left salivary gland is diffusely enlarged. Left salivary gland is not tender.      Right Ear: Tympanic membrane and ear canal normal.      Left Ear: Tympanic membrane and ear canal normal.      Nose: No rhinorrhea.      Mouth/Throat:      Pharynx: Oropharynx is clear. Uvula midline. No posterior oropharyngeal erythema.   Cardiovascular:      Rate and Rhythm: Normal rate and regular rhythm.   Pulmonary:      Effort: Pulmonary effort is normal.      Breath sounds: No wheezing or rhonchi.   Musculoskeletal:      Right lower leg: No edema.      Left lower leg: No edema.   Lymphadenopathy:      Cervical: No cervical adenopathy.   Neurological:      Mental Status: He is alert.         Assessment/Plan   Diagnoses and all orders for this visit:  Warthin tumor  Type 2 diabetes mellitus without complication, without long-term current use of insulin (Multi)  -     POCT glycosylated hemoglobin (Hb A1C) manually resulted  Paroxysmal atrial fibrillation (Multi)    .  Patient Instructions   Here for follow up.       For Diabetes - A1c is 5.8 - well controlled.  Diet controlled.     For mass on parotid - pt has warthrins tumor - slow growing benign mass.  Recommend seeing Dr. Durand to discuss surgical opinion.      For afib - stable - no issues.      Recheck in 6 months.  If you need seen sooner for surgical clearance please call

## 2025-02-07 NOTE — PATIENT INSTRUCTIONS
Here for follow up.      For Diabetes - A1c is 5.8 - well controlled.  Diet controlled.     For mass on parotid - pt has warthrins tumor - slow growing benign mass.  Recommend seeing Dr. Durand to discuss surgical opinion.      For afib - stable - no issues.      Recheck in 6 months.  If you need seen sooner for surgical clearance please call

## 2025-02-12 ENCOUNTER — APPOINTMENT (OUTPATIENT)
Dept: OTOLARYNGOLOGY | Facility: CLINIC | Age: 75
End: 2025-02-12
Payer: MEDICARE

## 2025-02-21 ENCOUNTER — APPOINTMENT (OUTPATIENT)
Dept: OTOLARYNGOLOGY | Facility: CLINIC | Age: 75
End: 2025-02-21
Payer: MEDICARE

## 2025-02-21 VITALS — BODY MASS INDEX: 25.62 KG/M2 | HEIGHT: 69 IN | WEIGHT: 173 LBS

## 2025-02-21 DIAGNOSIS — K11.8 MASS OF LEFT PAROTID GLAND: ICD-10-CM

## 2025-02-21 PROCEDURE — 1159F MED LIST DOCD IN RCRD: CPT | Performed by: OTOLARYNGOLOGY

## 2025-02-21 PROCEDURE — 3008F BODY MASS INDEX DOCD: CPT | Performed by: OTOLARYNGOLOGY

## 2025-02-21 PROCEDURE — 1160F RVW MEDS BY RX/DR IN RCRD: CPT | Performed by: OTOLARYNGOLOGY

## 2025-02-21 PROCEDURE — 99214 OFFICE O/P EST MOD 30 MIN: CPT | Performed by: OTOLARYNGOLOGY

## 2025-02-21 ASSESSMENT — PATIENT HEALTH QUESTIONNAIRE - PHQ9
1. LITTLE INTEREST OR PLEASURE IN DOING THINGS: NOT AT ALL
2. FEELING DOWN, DEPRESSED OR HOPELESS: NOT AT ALL
SUM OF ALL RESPONSES TO PHQ9 QUESTIONS 1 AND 2: 0

## 2025-02-21 NOTE — PROGRESS NOTES
Subjective   Patient ID: Clyde Sampson Jr. is a 74 y.o. male who presents for New Patient Visit (Parotid gland ).    The patient reports a history of noticing enlargement of his left neck in April or May of last year. It has not grown since then. No pain or tenderness. No numbness or changes in facial movements. A biopsy was done and came back as warthins tumor. He would like to have this removed.     Previously saw  January 2025 for left parotid. At that visit, discussed biopsy consistent with Warthin's Tumor. Discussed parotidectomy, which is desired treatment plan by patient. However, patient does not want to go to [facility] where  operates because patient reports it is too far from his home.     Tobacco use: The patient  reports that he quit smoking about 5 years ago. His smoking use included cigarettes. He has never used smokeless tobacco.     Objective   General: Examination reveals a well-developed, well-nourished patient in no apparent distress.  The patient has no audible dysphonia, stridor or airway distress.  The patient is oriented, alert and responsive.    Face: symmetric movement, no lesions  Oral Cavity:  The patient is able to open the mouth widely without trismus.  The floor of mouth and oral tongue are soft, and no mucosal abnormalities are noted on the lips or within the oral cavity.  The oral tongue is fully mobile and midline on protrusion.  The patient's teeth are poor with multiple missing  Oropharynx: There are no mucosal abnormalities noted within the oropharynx.  The soft palate elevates symmetrically, the tonsils and base of tongue are normal to inspection and palpation.       Salivary glands: Palpable, mobile left mass at tail of parotid. No other masses palpable  Neck: The neck is soft and symmetric.  There is no palpable adenopathy.     Assessment/Plan   Discussed diagnosis and indication for left parotidectomy. Patient accepting of surgical treatment plan  at [facility].  reviewed risks including anesthesia, facial nerve weakness, bleeding, possible need for overnight stay, and possible surgical drain placement.    If possible, patient would like to avoid an overnight stay due to history of hospital delirium.      Next step will be planning OR date with [facility] for left parotidectomy. Discussed need to monitor right sided parotid as well, and if expanding over time, may require parotidectomy on right side in future.     Jacob Gibson DMD, MD 02/21/25 12:45 PM       The above resident note has been reviewed and confirmed.  Patient was seen and examined with the resident today.  Documentation has been reviewed.          Left parotidectomy  Lengthy and detailed discussion was held with patient regarding parotidectomy including risk benefits and alternatives  including risks to the facial nerve was discussed. This includes partial or complete, temporary or permanent facial paralysis and the implications of each. They understand surgical incisions, numbness of the region, disfigurement, sialocele, salivary fistula, Sonam's syndrome and perioperative risks given any associated medical comorbidities. Length of surgery, expected outcomes have all reviewed in detail option reviewed.  Verbal consent was obtained

## 2025-02-24 ENCOUNTER — TELEPHONE (OUTPATIENT)
Dept: OTOLARYNGOLOGY | Facility: HOSPITAL | Age: 75
End: 2025-02-24
Payer: MEDICARE

## 2025-03-11 ENCOUNTER — CLINICAL SUPPORT (OUTPATIENT)
Dept: PREADMISSION TESTING | Facility: HOSPITAL | Age: 75
End: 2025-03-11
Payer: MEDICARE

## 2025-03-11 DIAGNOSIS — K11.8 MASS OF LEFT PAROTID GLAND: ICD-10-CM

## 2025-03-11 NOTE — CPM/PAT NURSE NOTE
"CPM/PAT Nurse Note      Name: Clyde Sampson JrPavan (Clyde Sampson )  /Age: 1950/74 y.o.       Past Medical History:   Diagnosis Date    Mass of left parotid gland     Plan: Left Parotid Gland Excision 3/27/25    Osteomyelitis of great toe of right foot (Multi)     S/P AMPUTATION TOE METATARSOPHALANGEAL JOINT 23    Overweight     PAF (paroxysmal atrial fibrillation) (Multi)     Per PCP note: \"stable, no issues - no palpitations, CP or SOB -Anticoagulation: pt refused anticoagulation -Cardiologist: none\"    Type 2 diabetes mellitus     23 A1C 6.3%       Past Surgical History:   Procedure Laterality Date    AMPUTATION FOOT / TOE  2023    AMPUTATION TOE METATARSOPHALANGEAL JOINT    CHOLECYSTECTOMY      CT ABDOMEN PELVIS ANGIOGRAM W AND/OR WO IV CONTRAST  10/06/2021    CT ABDOMEN PELVIS ANGIOGRAM W AND/OR WO IV CONTRAST [facility]    OTHER SURGICAL HISTORY  2023    TRANSESOPHOGEAL ECHO       Patient Sexual activity questions deferred to the physician.    Family History   Problem Relation Name Age of Onset    Cancer Mother         Allergies   Allergen Reactions    Penicillin V Hives    Latex, Natural Rubber Rash    Penicillin Rash       Prior to Admission medications    Medication Sig Start Date End Date Taking? Authorizing Provider   Accu-Chek Guide Glucose Meter misc USE AS DIRECTED TO TEST TWICE DAILY 3/17/23   Historical Provider, MD   Accu-Chek Guide test strips strip USE TO TEST TWICE DAILY AS DIRECTED 24   Curry Corbni, DO   aspirin 325 mg EC tablet 1 tablet Orally twice daily    Historical Provider, MD   CINNAMON BARK ORAL Take by mouth once daily.    Historical Provider, MD MARSHALL ROS     DASI Risk Score    No data to display       Caprini DVT Assessment    No data to display       Modified Frailty Index    No data to display       [id number] Stroke Risk Points  Current as of just now        3 0 to 9 Points:      Last Change:           The " [id number] risk score (Ray CARLOS et al. 2009. © 2010 American College [employer]) quantifies the risk of stroke for a patient with atrial fibrillation. For patients without atrial fibrillation or under the age of 18 this score appears as N/A. Higher score values generally indicate higher risk of stroke.          Points Metrics   0 Has Congestive Heart Failure:  No     Patients with congestive heart failure get 1 point.    Current as of just now   0 Has Hypertension:  No     Patients with hypertension get 1 point.    Current as of just now   1 Age:  74     Patients 65 to 74 years old get 1 point, or patients 75 years and older get 2 points.    Current as of just now   1 Has Diabetes:  Yes     Patients with diabetes get 1 point.    Current as of just now   0 Had Stroke:  No  Had TIA:  No  Had Thromboembolism:  No     Patients who have had a stroke, TIA, or thromboembolism get 2 points.    Current as of just now   1 Has Vascular Disease:  Yes     Patients with vascular disease get 1 point.    Current as of just now   0 Clinically Relevant Sex:  Male     Patients with a clinically relevant sex of Female get 1 point.    Current as of just now             Revised Cardiac Risk Index    No data to display       Apfel Simplified Score    No data to display       Risk Analysis Index Results This Encounter    No data found in the last 10 encounters.       Prodigy: High Risk  Total Score: 20              Prodigy Age Score      Prodigy Gender Score          ARISCAT Score for Postoperative Pulmonary Complications    No data to display       Jean Perioperative Risk for Myocardial Infarction or Cardiac Arrest (TAY)    No data to display         Nurse Plan of Action: RN screening call complete.  Reviewed allergies, medications and pharmacy, medical, surgical and social history with patient.  Chart updated.

## 2025-03-17 ENCOUNTER — PRE-ADMISSION TESTING (OUTPATIENT)
Dept: PREADMISSION TESTING | Facility: HOSPITAL | Age: 75
End: 2025-03-17
Payer: MEDICARE

## 2025-03-17 ENCOUNTER — LAB (OUTPATIENT)
Dept: LAB | Facility: HOSPITAL | Age: 75
End: 2025-03-17
Payer: MEDICARE

## 2025-03-17 VITALS
HEIGHT: 69 IN | BODY MASS INDEX: 24.52 KG/M2 | HEART RATE: 77 BPM | OXYGEN SATURATION: 97 % | DIASTOLIC BLOOD PRESSURE: 88 MMHG | WEIGHT: 165.57 LBS | TEMPERATURE: 97.6 F | SYSTOLIC BLOOD PRESSURE: 140 MMHG | RESPIRATION RATE: 20 BRPM

## 2025-03-17 DIAGNOSIS — Z01.818 PREOP TESTING: Primary | ICD-10-CM

## 2025-03-17 DIAGNOSIS — Z01.818 ENCOUNTER FOR OTHER PREPROCEDURAL EXAMINATION: Primary | ICD-10-CM

## 2025-03-17 LAB
ALBUMIN SERPL BCP-MCNC: 4.7 G/DL (ref 3.4–5)
ALP SERPL-CCNC: 75 U/L (ref 33–136)
ALT SERPL W P-5'-P-CCNC: 13 U/L (ref 10–52)
ANION GAP SERPL CALC-SCNC: 12 MMOL/L (ref 10–20)
AST SERPL W P-5'-P-CCNC: 14 U/L (ref 9–39)
BASOPHILS # BLD AUTO: 0.08 X10*3/UL (ref 0–0.1)
BASOPHILS NFR BLD AUTO: 0.8 %
BILIRUB SERPL-MCNC: 0.5 MG/DL (ref 0–1.2)
BUN SERPL-MCNC: 18 MG/DL (ref 6–23)
CALCIUM SERPL-MCNC: 10 MG/DL (ref 8.6–10.3)
CHLORIDE SERPL-SCNC: 101 MMOL/L (ref 98–107)
CO2 SERPL-SCNC: 31 MMOL/L (ref 21–32)
CREAT SERPL-MCNC: 1.04 MG/DL (ref 0.5–1.3)
EGFRCR SERPLBLD CKD-EPI 2021: 76 ML/MIN/1.73M*2
EOSINOPHIL # BLD AUTO: 0.07 X10*3/UL (ref 0–0.4)
EOSINOPHIL NFR BLD AUTO: 0.7 %
ERYTHROCYTE [DISTWIDTH] IN BLOOD BY AUTOMATED COUNT: 13.3 % (ref 11.5–14.5)
GLUCOSE SERPL-MCNC: 100 MG/DL (ref 74–99)
HCT VFR BLD AUTO: 52.2 % (ref 41–52)
HGB BLD-MCNC: 17.9 G/DL (ref 13.5–17.5)
IMM GRANULOCYTES # BLD AUTO: 0.07 X10*3/UL (ref 0–0.5)
IMM GRANULOCYTES NFR BLD AUTO: 0.7 % (ref 0–0.9)
LYMPHOCYTES # BLD AUTO: 1.48 X10*3/UL (ref 0.8–3)
LYMPHOCYTES NFR BLD AUTO: 15 %
MCH RBC QN AUTO: 30.6 PG (ref 26–34)
MCHC RBC AUTO-ENTMCNC: 34.3 G/DL (ref 32–36)
MCV RBC AUTO: 89 FL (ref 80–100)
MONOCYTES # BLD AUTO: 0.5 X10*3/UL (ref 0.05–0.8)
MONOCYTES NFR BLD AUTO: 5.1 %
NEUTROPHILS # BLD AUTO: 7.68 X10*3/UL (ref 1.6–5.5)
NEUTROPHILS NFR BLD AUTO: 77.7 %
NRBC BLD-RTO: 0 /100 WBCS (ref 0–0)
PLATELET # BLD AUTO: 258 X10*3/UL (ref 150–450)
POTASSIUM SERPL-SCNC: 4.7 MMOL/L (ref 3.5–5.3)
PROT SERPL-MCNC: 7.7 G/DL (ref 6.4–8.2)
RBC # BLD AUTO: 5.85 X10*6/UL (ref 4.5–5.9)
SODIUM SERPL-SCNC: 139 MMOL/L (ref 136–145)
WBC # BLD AUTO: 9.9 X10*3/UL (ref 4.4–11.3)

## 2025-03-17 PROCEDURE — 85025 COMPLETE CBC W/AUTO DIFF WBC: CPT

## 2025-03-17 PROCEDURE — 93005 ELECTROCARDIOGRAM TRACING: CPT | Performed by: PHYSICIAN ASSISTANT

## 2025-03-17 PROCEDURE — 80053 COMPREHEN METABOLIC PANEL: CPT

## 2025-03-17 PROCEDURE — 99204 OFFICE O/P NEW MOD 45 MIN: CPT | Performed by: PHYSICIAN ASSISTANT

## 2025-03-17 ASSESSMENT — ENCOUNTER SYMPTOMS
SHORTNESS OF BREATH: 0
EYE PAIN: 0
VOMITING: 0
NUMBNESS: 0
ARTHRALGIAS: 0
NECK STIFFNESS: 0
BLOOD IN STOOL: 0
COUGH: 0
CONSTIPATION: 0
DYSPNEA AT REST: 0
VISUAL CHANGE: 0
UNEXPECTED WEIGHT CHANGE: 0
PALPITATIONS: 0
TROUBLE SWALLOWING: 0
EYE DISCHARGE: 0
WOUND: 0
DOUBLE VISION: 0
MYALGIAS: 0
CHILLS: 0
WHEEZING: 0
DYSPNEA WITH EXERTION: 0
WEAKNESS: 0
DIFFICULTY URINATING: 0
CONFUSION: 0
RHINORRHEA: 0
ABDOMINAL DISTENTION: 0
NECK PAIN: 0
NECK SWELLING: 1
DIARRHEA: 0
NAUSEA: 0
SINUS CONGESTION: 0
HEMOPTYSIS: 0
FEVER: 0
DYSURIA: 0
BRUISES/BLEEDS EASILY: 0
EXCESSIVE BLEEDING: 0
LIGHT-HEADEDNESS: 0
LIMITED RANGE OF MOTION: 0
ABDOMINAL PAIN: 0
SKIN CHANGES: 0

## 2025-03-17 NOTE — H&P (VIEW-ONLY)
"[facility]/PAT Evaluation       Name: Clyde Sampson Jr. (Clyde Sampson Jr.)  /Age: 1950/74 y.o.       Date of Consult: 3/17/25    Referring Provider: Dr. Durand    Surgery, Date, and Length:     Left Parotid Gland Excision - Left   3/27/25, 150MIN    Clyde Sampson Jr. is a 74 y.o. year-old male who presents to the [facility] for perioperative risk assessment prior to surgery.    Patient presents with a primary diagnosis of left parotid mass. Pt first noticed mass in May 2024. Pt denies any pain and no discharge from the affected area.  Pt does mention he notices more saliva production, especially at night time.  Pt wishes to proceed with surgery as planned.     This note was created in part upon personal review of patient's medical records.      Patient is scheduled to have Left Parotid Gland Excision - Left      Pt denies any past history of anesthetic complications such as PONV, awareness, prolonged sedation, dental damage, aspiration, cardiac arrest, difficult intubation, difficult I.V. access or unexpected hospital admissions.  NO malignant hyperthermia and or pseudocholinesterase deficiency.  No history of blood transfusions     The patient is not a Confucianism and will accept blood and blood products if medically indicated.   Type and screen NOT sent.     Past Medical History:   Diagnosis Date    Mass of left parotid gland     Plan: Left Parotid Gland Excision 3/27/25    Osteomyelitis of great toe of right foot (Multi)     S/P AMPUTATION TOE METATARSOPHALANGEAL JOINT 23    Overweight     PAF (paroxysmal atrial fibrillation) (Multi)     Per PCP note: \"stable, no issues - no palpitations, CP or SOB -Anticoagulation: pt refused anticoagulation -Cardiologist: none\"    Type 2 diabetes mellitus     23 A1C 6.3%       Past Surgical History:   Procedure Laterality Date    AMPUTATION FOOT / TOE  2023    AMPUTATION TOE METATARSOPHALANGEAL JOINT    CARDIOVERSION  2023    " CHOLECYSTECTOMY      CT ABDOMEN PELVIS ANGIOGRAM W AND/OR WO IV CONTRAST  10/06/2021    CT ABDOMEN PELVIS ANGIOGRAM W AND/OR WO IV CONTRAST [facility]    OTHER SURGICAL HISTORY  07/13/2023    TRANSESOPHOGEAL ECHO       Patient Sexual activity questions deferred to the physician.    Family History   Problem Relation Name Age of Onset    Cancer Mother         Allergies   Allergen Reactions    Penicillin V Hives    Latex, Natural Rubber Rash    Penicillin Rash       Current Outpatient Medications   Medication Instructions    Accu-Chek Guide Glucose Meter misc USE AS DIRECTED TO TEST TWICE DAILY    Accu-Chek Guide test strips strip USE TO TEST TWICE DAILY AS DIRECTED    APPLE CIDER VINEGAR ORAL Take by mouth.    aspirin 325 mg EC tablet 1 tablet Orally twice daily    CINNAMON BARK ORAL Daily           PAT ROS:   Constitutional:    no fever   no chills   no unexpected weight change  Neuro/Psych:    no numbness   no weakness   no light-headedness   no confusion  Eyes:    no discharge   no pain   no vision loss   no diplopia   no visual disturbance  Ears:    no ear pain   no hearing loss   no tinnitus  Nose:    no nasal discharge   no sinus congestion   no epistaxis  Mouth:    no dental issues   no mouth pain   no oral bleeding   no mouth lesions  Throat:    no throat pain   no dysphagia  Neck:    Left parotid enlargement; palpable masses just adjacent to SCM; size of dried apricot   no neck pain   neck swelling (left parotid gland  swellling)   no neck stiffness  Cardio:    Functional 4 Mets. Patient denies SOB walking up 2 flights of stairs   Walking, cooking, cleaning, grocery shopping   no chest pain   no palpitations   no peripheral edema   no dyspnea   no CHAMBERS  Respiratory:    no cough   no wheezing   no hemoptysis   no shortness of breath  Endocrine:    no cold intolerance   no heat intolerance  GI:    no abdominal distention   no abdominal pain   no constipation   no diarrhea   no nausea   no vomiting   no  blood in stool  :    no difficulty urinating   no dysuria   no oliguria  Musculoskeletal:    no arthralgias   no myalgias   no decreased ROM  Hematologic:    does not bruise/bleed easily   no excessive bleeding   no history of blood transfusion   no blood clots  Skin:   no skin changes   no sores/wound   no rash      Physical Exam  Constitutional:       General: He is not in acute distress.     Appearance: Normal appearance. He is not ill-appearing, toxic-appearing or diaphoretic.   HENT:      Head: Normocephalic and atraumatic.      Nose: Nose normal. No congestion or rhinorrhea.      Mouth/Throat:      Mouth: Mucous membranes are moist.      Pharynx: No posterior oropharyngeal erythema.   Eyes:      Extraocular Movements: Extraocular movements intact.      Conjunctiva/sclera: Conjunctivae normal.   Neck:      Comments: Left parotid enlargement; palpable masses just adjacent to SCM; size of dried apricot  Cardiovascular:      Rate and Rhythm: Normal rate and regular rhythm.      Pulses: Normal pulses.      Heart sounds: Normal heart sounds. No murmur heard.     No friction rub. No gallop.   Pulmonary:      Effort: Pulmonary effort is normal. No respiratory distress.      Breath sounds: Normal breath sounds. No stridor. No wheezing, rhonchi or rales.   Abdominal:      General: Bowel sounds are normal. There is no distension.      Palpations: Abdomen is soft. There is no mass.      Tenderness: There is no abdominal tenderness. There is no guarding or rebound.      Hernia: No hernia is present.   Musculoskeletal:         General: No swelling, tenderness, deformity or signs of injury. Normal range of motion.      Cervical back: Normal range of motion and neck supple. No rigidity or tenderness.   Skin:     General: Skin is warm and dry.      Coloration: Skin is not jaundiced or pale.      Findings: No bruising, erythema, lesion or rash.   Neurological:      General: No focal deficit present.      Mental Status: He is  "alert and oriented to person, place, and time.      Cranial Nerves: No cranial nerve deficit.      Sensory: No sensory deficit.      Motor: No weakness.      Coordination: Coordination normal.   Psychiatric:         Mood and Affect: Mood normal.         Behavior: Behavior normal.          PAT AIRWAY:   Airway:     Mallampati::  III    Neck ROM::  Full   Many missing teeth; only 2 teeth on top    Visit Vitals  /88   Pulse 77   Temp 36.4 °C (97.6 °F)   Resp 20   Ht 1.753 m (5' 9\")   Wt 75.1 kg (165 lb 9.1 oz)   SpO2 97%   BMI 24.45 kg/m²   Smoking Status Former   BSA 1.91 m²         LABS:  Lab Results   Component Value Date    HGBA1C 5.8 02/07/2025      Lab Results   Component Value Date    WBC 9.9 03/17/2025    HGB 17.9 (H) 03/17/2025    HCT 52.2 (H) 03/17/2025    MCV 89 03/17/2025     03/17/2025      Lab Results   Component Value Date    GLUCOSE 100 (H) 03/17/2025    CALCIUM 10.0 03/17/2025     03/17/2025    K 4.7 03/17/2025    CO2 31 03/17/2025     03/17/2025    BUN 18 03/17/2025    CREATININE 1.04 03/17/2025        EKG 3/17/25  A fib  Nonspecific ST and T wave abnormality, probably digitalis effect  Abnormal EKG   Vent rate = 91 bpm      ECHO 7/13/23  CONCLUSIONS:   - Exam indication: Pre Cardioversion, Pre AF Ablation   - The left ventricle is dilated. Left ventricular systolic function is mildly   decreased. EF = 45 ± 5% (visual est.)   - The right ventricle is dilated. Right ventricular systolic function is low   normal.   - The left atrial cavity is dilated. There is no thrombus in the left atrium or   its appendage.   - The right atrial cavity is dilated.   - There are no significant valvular abnormalities.   - There is moderate (2+) mitral valve regurgitation due to apical tethering of   normal mitral leaflet caused by LV enlargement.   - There is moderate (2+) tricuspid valve regurgitation caused by annular   dilatation.   - The patient has not had a prior CC echocardiographic exam " for comparison.       Assessment and Plan:     74 y.o.  male  scheduled for Left Parotid Gland Excision - Left on 3/27/25 with Dr. Durand for  left parotid mass.   Presents to [facility] today for perioperative risk stratification and optimization      Cardiovascular:  Patient has no active cardiac symptoms.   Patient denies any chest pain, tightness, heaviness, pressure, radiating pain, palpitations, irregular heartbeats, lightheadedness, cough, congestion, shortness of breath, CHAMBERS, PND, near syncope, weight loss or gain.    METS: 4+  RCRI: 0 points, 3.9%  risk for postoperative MACE     Afib - pt declines anticoagulant therapy; hold ASA 325mg 7 days before surgery (will take ASA 81mg while holding); EKG today shows A fib    Elevated BP - BP today is quite elevated at 145/125    Pulmonary:  No pulmonary diagnosis, however patient is at increased risk of perioperative complications secondary to  age > 60, duration of surgery > 2 hours  Stop Bang score is 2 placing patient at low risk for STEPH  ARISCAT: <26 points, 1.6% risk of in-hospital postoperative pulmonary complication  PRODIGY: High risk for opioid induced respiratory depression    **Pt provided with deep breathing exercises, incentive spirometer and instructions for use during PAT visit today**    PE - 10/2021 - unprovoked; no current anticoagulants    Endocrine:  DMII - diet controlled   A1c 2/7/25 = 5.8%     Neuro:  No neurologic diagnosis, however, the patient is at increased risk for perioperative delirium secondary to  age, type and duration of surgery, Patient instructed on and provided cognitive exercises    Hematology:  Elevated H/H  5/31/23 H/H 16.7/48.3%  7/12/23 H/H 16.4/48.5%  3/17/25 H/H 17.9/52.2%    Patient instructed to ambulate as soon as possible postoperatively to decrease thromboembolic risk.   Initiate mechanical DVT prophylaxis as soon as possible and initiate chemical prophylaxis when deemed safe from a bleeding standpoint post surgery.      LABS: CBC, CMP and EKG ordered    Followup: Labs pending    Addendum 3/18/25:  Lab results reviewed and show elevated H/H which is essentially stable    Caprini: 7    Risk assessment complete.  Patient is scheduled for a low to intermediate surgical risk procedure.        Preoperative medication instructions were provided and reviewed with the patient.  Any additional testing or evaluation was explained to the patient.  Nothing by mouth instructions were discussed and patient's questions were answered prior to conclusion to this encounter.  Patient verbalized understanding of preoperative instructions given in preadmission testing; discharge instructions available in EMR.    This note was dictated by a speech recognition.  Minor errors may have been detected in a speech recognition.

## 2025-03-17 NOTE — CPM/PAT NURSE NOTE
"CPM/PAT Nurse Note      Name: Clyde Sampson  (Clyde Sampson )  /Age: 1950/74 y.o.       Past Medical History:   Diagnosis Date    Mass of left parotid gland     Plan: Left Parotid Gland Excision 3/27/25    Osteomyelitis of great toe of right foot (Multi)     S/P AMPUTATION TOE METATARSOPHALANGEAL JOINT 23    Overweight     PAF (paroxysmal atrial fibrillation) (Multi)     Per PCP note: \"stable, no issues - no palpitations, CP or SOB -Anticoagulation: pt refused anticoagulation -Cardiologist: none\"    Pulmonary emboli     Type 2 diabetes mellitus     23 A1C 6.3%       Past Surgical History:   Procedure Laterality Date    AMPUTATION FOOT / TOE  2023    AMPUTATION TOE METATARSOPHALANGEAL JOINT    CARDIOVERSION  2023    CHOLECYSTECTOMY      CT ABDOMEN PELVIS ANGIOGRAM W AND/OR WO IV CONTRAST  10/06/2021    CT ABDOMEN PELVIS ANGIOGRAM W AND/OR WO IV CONTRAST [facility]    OTHER SURGICAL HISTORY  2023    TRANSESOPHOGEAL ECHO       Patient Sexual activity questions deferred to the physician.    Family History   Problem Relation Name Age of Onset    Cancer Mother         Allergies   Allergen Reactions    Penicillin V Hives    Latex, Natural Rubber Rash    Penicillin Rash       Prior to Admission medications    Medication Sig Start Date End Date Taking? Authorizing Provider   Accu-Chek Guide Glucose Meter misc USE AS DIRECTED TO TEST TWICE DAILY 3/17/23  Yes Historical Provider, MD   Accu-Chek Guide test strips strip USE TO TEST TWICE DAILY AS DIRECTED 24  Yes Curry Corbin,    APPLE CIDER VINEGAR ORAL Take by mouth.   Yes Historical Provider, MD   aspirin 325 mg EC tablet 1 tablet Orally twice daily   Yes Historical Provider, MD   CINNAMON BARK ORAL Take by mouth once daily.   Yes Historical Provider, MD JOANNA ALONSO     DASI Risk Score    No data to display       Caprini DVT Assessment    No data to display       Modified Frailty Index    No data to " display       [id number] Stroke Risk Points  Current as of 14 minutes ago        3 0 to 9 Points:      Last Change:           The [id number] risk score (Lip BREANNA, et al. 2009. © 2010 American College [employer]) quantifies the risk of stroke for a patient with atrial fibrillation. For patients without atrial fibrillation or under the age of 18 this score appears as N/A. Higher score values generally indicate higher risk of stroke.          Points Metrics   0 Has Congestive Heart Failure:  No     Patients with congestive heart failure get 1 point.    Current as of 14 minutes ago   0 Has Hypertension:  No     Patients with hypertension get 1 point.    Current as of 14 minutes ago   1 Age:  74     Patients 65 to 74 years old get 1 point, or patients 75 years and older get 2 points.    Current as of 14 minutes ago   1 Has Diabetes:  Yes     Patients with diabetes get 1 point.    Current as of 14 minutes ago   0 Had Stroke:  No  Had TIA:  No  Had Thromboembolism:  No     Patients who have had a stroke, TIA, or thromboembolism get 2 points.    Current as of 14 minutes ago   1 Has Vascular Disease:  Yes     Patients with vascular disease get 1 point.    Current as of 14 minutes ago   0 Clinically Relevant Sex:  Male     Patients with a clinically relevant sex of Female get 1 point.    Current as of 14 minutes ago             Revised Cardiac Risk Index    No data to display       Apfel Simplified Score    No data to display       Risk Analysis Index Results This Encounter    No data found in the last 10 encounters.       Prodigy: High Risk  Total Score: 20              Prodigy Age Score      Prodigy Gender Score          ARISCAT Score for Postoperative Pulmonary Complications      Flowsheet Row Pre-Admission Testing from 3/17/2025 in [facility] with Dianna Alvarado PA-C   Age Calculated Score 3 filed at 03/17/2025 1318   Preoperative SpO2 0 filed at 03/17/2025 1318   Respiratory infection in the  last month Either upper or lower (i.e., URI, bronchitis, pneumonia), with fever and antibiotic treatment 0 filed at 03/17/2025 1318   Preoperative anemia (Hgb less than 10 g/dl) 0 filed at 03/17/2025 1318   Surgical incision  0 filed at 03/17/2025 1318   Duration of surgery  16 filed at 03/17/2025 1318   Emergency Procedure  0 filed at 03/17/2025 1318   ARISCAT Total Score  19 filed at 03/17/2025 1318          Miranda Perioperative Risk for Myocardial Infarction or Cardiac Arrest (TAY)    No data to display         Nurse Plan of Action:   After Visit Summary (AVS) reviewed and patient verbalized good understanding of medications and NPO instructions.

## 2025-03-17 NOTE — PREPROCEDURE INSTRUCTIONS
Medication List            Accurate as of March 17, 2025  1:30 PM. Always use your most recent med list.                Accu-Chek Guide Glucose Meter misc  Generic drug: blood-glucose meter     Accu-Chek Guide test strips strip  Generic drug: blood sugar diagnostic  USE TO TEST TWICE DAILY AS DIRECTED     APPLE CIDER VINEGAR ORAL     aspirin 325 mg EC tablet  Additional Medication Adjustments for Surgery: Take last dose 7 days before surgery  Notes to patient: Last dose 3/19/25  (**please take 81 mg aspirin while holding 325mg aspirin; ok to take 81mg aspirin morning of surgery as well**)     CINNAMON BARK ORAL  Additional Medication Adjustments for Surgery: Take last dose 7 days before surgery  Notes to patient: Last dose 3/19/25                          **Concerning above medication instructions, if medication is normally taken at night, continue normal schedule.**  **DO NOT TAKE NIGHT PRIOR AND MORNING OF SURGERY**    CONTACT SURGEON'S OFFICE IF YOU DEVELOP:  * Fever = 100.4 F   * New respiratory symptoms (e.g. cough, shortness of breath, respiratory distress, sore throat)  * Recent loss of taste or smell  *Flu like symptoms such as headache, fatigue or gastrointestinal symptoms  * You develop any open sores, shingles, burning or painful urination   AND/OR:  * You no longer wish to have the surgery.  * Any other personal circumstances change that may lead to the need to cancel or defer this surgery.  *You were admitted to any hospital within one week of your planned procedure.    SMOKING:  *Quitting smoking can make a huge difference to your health and recovery from surgery.    *If you need help with quitting, call 5-784-QUIT-NOW.    THE DAY OF SURGERY:  *Do not eat any food after midnight the night before surgery.   *You must drink 13.5 ounces of clear liquids (i.e. water, black coffee (no milk or cream), tea, apple juice or electrolyte drinks (gatorade)) 2 hours before your arrival time.  *You may chew gum  until 2 hours before your surgery    SURGICAL TIME  *You will be contacted between 2 p.m. and 6 p.m. the business day before your surgery with your arrival time.  *If you haven't received a call by 6pm, call [phone].  *Scheduled surgery times may change and you will be notified if this occurs-check your personal voicemail for any updates.    ON THE MORNING OF SURGERY:  *Wear comfortable, loose fitting clothing.   *Do not use moisturizers, creams, lotions or perfume.  *All jewelry and valuables should be left at home.  *Prosthetic devices such as contact lenses, hearing aids, dentures, eyelash extensions, hairpins and body piercing must be removed before surgery.    BRING WITH YOU:  *Photo ID and insurance card  *Current list of medicines and allergies  *Pacemaker/Defibrillator/Heart stent cards  *CPAP machine and mask  *Slings/splints/crutches  *Copy of your complete Advanced Directive/DHPOA-if applicable  *Neurostimulator implant remote    PARKING AND ARRIVAL:  *Check in at the Main Entrance desk and let them know you are here for surgery.  *You will be directed to the 2nd floor surgical waiting area.    AFTER OUTPATIENT SURGERY:  *A responsible adult MUST accompany you at the time of discharge and stay with you for 24 hours after your surgery.  *You may NOT drive yourself home after surgery.  *You may use a taxi or ride sharing service ([employer], [employer]) to return home ONLY if you are accompanied by a friend or family member.  *Instructions for resuming your medications will be provided by your surgeon.       Preoperative Deep Breathing Exercises  Why it is important to do deep breathing exercises before my surgery?  Deep breathing exercises strengthen your breathing muscles.  This helps you to recover after your surgery and decreases the chance of breathing complications.  How are the deep breathing exercises done?  Sit straight with your back supported.  Breathe in deeply and slowly through your nose. Your lower  rib cage should expand and your abdomen may move forward.  Hold that breath for 3 to 5 seconds.  Breathe out through pursed lips, slowly and completely.  Rest and repeat 10 times every hour while awake.  Rest longer if you become dizzy or lightheaded.        Preoperative Brain Exercises    What are brain exercises?  A brain exercise is any activity that engages your thinking (cognitive) skills.    What types of activities are considered brain exercises?  Jigsaw puzzles, crossword puzzles, word jumble, memory games, word search, and many more.  Many can be found free online or on your phone via a mobile manjit.    Why should I do brain exercises before my surgery?  More recent research has shown brain exercise before surgery can lower the risk of postoperative delirium (confusion) which can be especially important for older adults.  Patients who did brain exercises for 5 to 10 hours (total) for the 7-10 days before surgery, cut their risk of postoperative delirium in half up to 1 week after surgery.

## 2025-03-17 NOTE — CPM/PAT H&P
"[facility]/PAT Evaluation       Name: Clyde Sampson Jr. (Clyde Sampson Jr.)  /Age: 1950/74 y.o.       Date of Consult: 3/17/25    Referring Provider: Dr. Durand    Surgery, Date, and Length:     Left Parotid Gland Excision - Left   3/27/25, 150MIN    Clyde Sampson Jr. is a 74 y.o. year-old male who presents to the [facility] for perioperative risk assessment prior to surgery.    Patient presents with a primary diagnosis of left parotid mass. Pt first noticed mass in May 2024. Pt denies any pain and no discharge from the affected area.  Pt does mention he notices more saliva production, especially at night time.  Pt wishes to proceed with surgery as planned.     This note was created in part upon personal review of patient's medical records.      Patient is scheduled to have Left Parotid Gland Excision - Left      Pt denies any past history of anesthetic complications such as PONV, awareness, prolonged sedation, dental damage, aspiration, cardiac arrest, difficult intubation, difficult I.V. access or unexpected hospital admissions.  NO malignant hyperthermia and or pseudocholinesterase deficiency.  No history of blood transfusions     The patient is not a Tenriism and will accept blood and blood products if medically indicated.   Type and screen NOT sent.     Past Medical History:   Diagnosis Date    Mass of left parotid gland     Plan: Left Parotid Gland Excision 3/27/25    Osteomyelitis of great toe of right foot (Multi)     S/P AMPUTATION TOE METATARSOPHALANGEAL JOINT 23    Overweight     PAF (paroxysmal atrial fibrillation) (Multi)     Per PCP note: \"stable, no issues - no palpitations, CP or SOB -Anticoagulation: pt refused anticoagulation -Cardiologist: none\"    Type 2 diabetes mellitus     23 A1C 6.3%       Past Surgical History:   Procedure Laterality Date    AMPUTATION FOOT / TOE  2023    AMPUTATION TOE METATARSOPHALANGEAL JOINT    CARDIOVERSION  2023    " CHOLECYSTECTOMY      CT ABDOMEN PELVIS ANGIOGRAM W AND/OR WO IV CONTRAST  10/06/2021    CT ABDOMEN PELVIS ANGIOGRAM W AND/OR WO IV CONTRAST [facility]LL[facility] LEGAC[facility]    OTHER SURGICAL HISTORY  07/13/2023    TRANSESOPHOGEAL ECHO       Patient Sexual activity questions deferred to the physician.    Family History   Problem Relation Name Age of Onset    Cancer Mother         Allergies   Allergen Reactions    Penicillin V Hives    Latex, Natural Rubber Rash    Penicillin Rash       Current Outpatient Medications   Medication Instructions    Accu-Chek Guide Glucose Meter misc USE AS DIRECTED TO TEST TWICE DAILY    Accu-Chek Guide test strips strip USE TO TEST TWICE DAILY AS DIRECTED    APPLE CIDER VINEGAR ORAL Take by mouth.    aspirin 325 mg EC tablet 1 tablet Orally twice daily    CINNAMON BARK ORAL Daily           PAT ROS:   Constitutional:    no fever   no chills   no unexpected weight change  Neuro/Psych:    no numbness   no weakness   no light-headedness   no confusion  Eyes:    no discharge   no pain   no vision loss   no diplopia   no visual disturbance  Ears:    no ear pain   no hearing loss   no tinnitus  Nose:    no nasal discharge   no sinus congestion   no epistaxis  Mouth:    no dental issues   no mouth pain   no oral bleeding   no mouth lesions  Throat:    no throat pain   no dysphagia  Neck:    Left parotid enlargement; palpable masses just adjacent to SCM; size of dried apricot   no neck pain   neck swelling (left parotid gland  swellling)   no neck stiffness  Cardio:    Functional 4 Mets. Patient denies SOB walking up 2 flights of stairs   Walking, cooking, cleaning, grocery shopping   no chest pain   no palpitations   no peripheral edema   no dyspnea   no CHAMBERS  Respiratory:    no cough   no wheezing   no hemoptysis   no shortness of breath  Endocrine:    no cold intolerance   no heat intolerance  GI:    no abdominal distention   no abdominal pain   no constipation   no diarrhea   no nausea   no vomiting   no  blood in stool  :    no difficulty urinating   no dysuria   no oliguria  Musculoskeletal:    no arthralgias   no myalgias   no decreased ROM  Hematologic:    does not bruise/bleed easily   no excessive bleeding   no history of blood transfusion   no blood clots  Skin:   no skin changes   no sores/wound   no rash      Physical Exam  Constitutional:       General: He is not in acute distress.     Appearance: Normal appearance. He is not ill-appearing, toxic-appearing or diaphoretic.   HENT:      Head: Normocephalic and atraumatic.      Nose: Nose normal. No congestion or rhinorrhea.      Mouth/Throat:      Mouth: Mucous membranes are moist.      Pharynx: No posterior oropharyngeal erythema.   Eyes:      Extraocular Movements: Extraocular movements intact.      Conjunctiva/sclera: Conjunctivae normal.   Neck:      Comments: Left parotid enlargement; palpable masses just adjacent to SCM; size of dried apricot  Cardiovascular:      Rate and Rhythm: Normal rate and regular rhythm.      Pulses: Normal pulses.      Heart sounds: Normal heart sounds. No murmur heard.     No friction rub. No gallop.   Pulmonary:      Effort: Pulmonary effort is normal. No respiratory distress.      Breath sounds: Normal breath sounds. No stridor. No wheezing, rhonchi or rales.   Abdominal:      General: Bowel sounds are normal. There is no distension.      Palpations: Abdomen is soft. There is no mass.      Tenderness: There is no abdominal tenderness. There is no guarding or rebound.      Hernia: No hernia is present.   Musculoskeletal:         General: No swelling, tenderness, deformity or signs of injury. Normal range of motion.      Cervical back: Normal range of motion and neck supple. No rigidity or tenderness.   Skin:     General: Skin is warm and dry.      Coloration: Skin is not jaundiced or pale.      Findings: No bruising, erythema, lesion or rash.   Neurological:      General: No focal deficit present.      Mental Status: He is  "alert and oriented to person, place, and time.      Cranial Nerves: No cranial nerve deficit.      Sensory: No sensory deficit.      Motor: No weakness.      Coordination: Coordination normal.   Psychiatric:         Mood and Affect: Mood normal.         Behavior: Behavior normal.          PAT AIRWAY:   Airway:     Mallampati::  III    Neck ROM::  Full   Many missing teeth; only 2 teeth on top    Visit Vitals  /88   Pulse 77   Temp 36.4 °C (97.6 °F)   Resp 20   Ht 1.753 m (5' 9\")   Wt 75.1 kg (165 lb 9.1 oz)   SpO2 97%   BMI 24.45 kg/m²   Smoking Status Former   BSA 1.91 m²         LABS:  Lab Results   Component Value Date    HGBA1C 5.8 02/07/2025      Lab Results   Component Value Date    WBC 9.9 03/17/2025    HGB 17.9 (H) 03/17/2025    HCT 52.2 (H) 03/17/2025    MCV 89 03/17/2025     03/17/2025      Lab Results   Component Value Date    GLUCOSE 100 (H) 03/17/2025    CALCIUM 10.0 03/17/2025     03/17/2025    K 4.7 03/17/2025    CO2 31 03/17/2025     03/17/2025    BUN 18 03/17/2025    CREATININE 1.04 03/17/2025        EKG 3/17/25  A fib  Nonspecific ST and T wave abnormality, probably digitalis effect  Abnormal EKG   Vent rate = 91 bpm      ECHO 7/13/23  CONCLUSIONS:   - Exam indication: Pre Cardioversion, Pre AF Ablation   - The left ventricle is dilated. Left ventricular systolic function is mildly   decreased. EF = 45 ± 5% (visual est.)   - The right ventricle is dilated. Right ventricular systolic function is low   normal.   - The left atrial cavity is dilated. There is no thrombus in the left atrium or   its appendage.   - The right atrial cavity is dilated.   - There are no significant valvular abnormalities.   - There is moderate (2+) mitral valve regurgitation due to apical tethering of   normal mitral leaflet caused by LV enlargement.   - There is moderate (2+) tricuspid valve regurgitation caused by annular   dilatation.   - The patient has not had a prior CC echocardiographic exam " for comparison.       Assessment and Plan:     74 y.o.  male  scheduled for Left Parotid Gland Excision - Left on 3/27/25 with Dr. Durand for  left parotid mass.   Presents to [facility] for perioperative risk stratification and optimization      Cardiovascular:  Patient has no active cardiac symptoms.   Patient denies any chest pain, tightness, heaviness, pressure, radiating pain, palpitations, irregular heartbeats, lightheadedness, cough, congestion, shortness of breath, CHAMBERS, PND, near syncope, weight loss or gain.    METS: 4+  RCRI: 0 points, 3.9%  risk for postoperative MACE     Afib - pt declines anticoagulant therapy; hold ASA 325mg 7 days before surgery (will take ASA 81mg while holding); EKG today shows A fib    Elevated BP - BP today is quite elevated at 145/125    Pulmonary:  No pulmonary diagnosis, however patient is at increased risk of perioperative complications secondary to  age > 60, duration of surgery > 2 hours  Stop Bang score is 2 placing patient at low risk for STEPH  ARISCAT: <26 points, 1.6% risk of in-hospital postoperative pulmonary complication  PRODIGY: High risk for opioid induced respiratory depression    **Pt provided with deep breathing exercises, incentive spirometer and instructions for use during PAT visit today**    PE - 10/2021 - unprovoked; no current anticoagulants    Endocrine:  DMII - diet controlled   A1c 2/7/25 = 5.8%     Neuro:  No neurologic diagnosis, however, the patient is at increased risk for perioperative delirium secondary to  age, type and duration of surgery, Patient instructed on and provided cognitive exercises    Hematology:  Elevated H/H  5/31/23 H/H 16.7/48.3%  7/12/23 H/H 16.4/48.5%  3/17/25 H/H 17.9/52.2%    Patient instructed to ambulate as soon as possible postoperatively to decrease thromboembolic risk.   Initiate mechanical DVT prophylaxis as soon as possible and initiate chemical prophylaxis when deemed safe from a bleeding standpoint post surgery.      LABS: CBC, CMP and EKG ordered    Followup: Labs pending    Addendum 3/18/25:  Lab results reviewed and show elevated H/H which is essentially stable    Caprini: 7    Risk assessment complete.  Patient is scheduled for a low to intermediate surgical risk procedure.        Preoperative medication instructions were provided and reviewed with the patient.  Any additional testing or evaluation was explained to the patient.  Nothing by mouth instructions were discussed and patient's questions were answered prior to conclusion to this encounter.  Patient verbalized understanding of preoperative instructions given in preadmission testing; discharge instructions available in EMR.    This note was dictated by a speech recognition.  Minor errors may have been detected in a speech recognition.

## 2025-03-26 NOTE — PRE-PROCEDURE NOTE
"3/26/2025 1236: Called pt with pre-op instructions. Stated that pt needs a responsible adult to review dc instructions, drive him home, and stay with him for 24 hrs. Pt states that his fiance is supposed to drive him, then proceded to state \"Well I might just sign myself out AMA\". I instructed pt that he cannot drive under the influence of anesthesia for 24 hours post-op.   "

## 2025-03-27 ENCOUNTER — ANESTHESIA EVENT (OUTPATIENT)
Dept: OPERATING ROOM | Facility: HOSPITAL | Age: 75
End: 2025-03-27
Payer: MEDICARE

## 2025-03-27 ENCOUNTER — ANESTHESIA (OUTPATIENT)
Dept: OPERATING ROOM | Facility: HOSPITAL | Age: 75
End: 2025-03-27
Payer: MEDICARE

## 2025-03-27 ENCOUNTER — HOSPITAL ENCOUNTER (OUTPATIENT)
Facility: HOSPITAL | Age: 75
Discharge: HOME | End: 2025-03-28
Attending: OTOLARYNGOLOGY | Admitting: OTOLARYNGOLOGY
Payer: MEDICARE

## 2025-03-27 DIAGNOSIS — K11.8 MASS OF LEFT PAROTID GLAND: ICD-10-CM

## 2025-03-27 PROBLEM — I26.99 PULMONARY EMBOLUS: Status: ACTIVE | Noted: 2025-03-27

## 2025-03-27 LAB
GLUCOSE BLD MANUAL STRIP-MCNC: 117 MG/DL (ref 74–99)
GLUCOSE BLD MANUAL STRIP-MCNC: 147 MG/DL (ref 74–99)
GLUCOSE BLD MANUAL STRIP-MCNC: 185 MG/DL (ref 74–99)
GLUCOSE BLD MANUAL STRIP-MCNC: 207 MG/DL (ref 74–99)

## 2025-03-27 PROCEDURE — 42415 EXCISE PAROTID GLAND/LESION: CPT | Performed by: OTOLARYNGOLOGY

## 2025-03-27 PROCEDURE — 7100000002 HC RECOVERY ROOM TIME - EACH INCREMENTAL 1 MINUTE: Performed by: OTOLARYNGOLOGY

## 2025-03-27 PROCEDURE — 2500000005 HC RX 250 GENERAL PHARMACY W/O HCPCS: Performed by: OTOLARYNGOLOGY

## 2025-03-27 PROCEDURE — 2500000004 HC RX 250 GENERAL PHARMACY W/ HCPCS (ALT 636 FOR OP/ED): Performed by: OTOLARYNGOLOGY

## 2025-03-27 PROCEDURE — 7100000001 HC RECOVERY ROOM TIME - INITIAL BASE CHARGE: Performed by: OTOLARYNGOLOGY

## 2025-03-27 PROCEDURE — 7100000011 HC EXTENDED STAY RECOVERY HOURLY - NURSING UNIT

## 2025-03-27 PROCEDURE — A42420 PR EXC PAROTD,TOTAL,DISSECT 5TH NERV: Performed by: ANESTHESIOLOGIST ASSISTANT

## 2025-03-27 PROCEDURE — 96372 THER/PROPH/DIAG INJ SC/IM: CPT | Performed by: NURSE PRACTITIONER

## 2025-03-27 PROCEDURE — G0378 HOSPITAL OBSERVATION PER HR: HCPCS

## 2025-03-27 PROCEDURE — 82947 ASSAY GLUCOSE BLOOD QUANT: CPT

## 2025-03-27 PROCEDURE — 2500000004 HC RX 250 GENERAL PHARMACY W/ HCPCS (ALT 636 FOR OP/ED): Performed by: ANESTHESIOLOGIST ASSISTANT

## 2025-03-27 PROCEDURE — 2500000004 HC RX 250 GENERAL PHARMACY W/ HCPCS (ALT 636 FOR OP/ED): Performed by: NURSE PRACTITIONER

## 2025-03-27 PROCEDURE — 88307 TISSUE EXAM BY PATHOLOGIST: CPT | Mod: TC,AHULAB | Performed by: OTOLARYNGOLOGY

## 2025-03-27 PROCEDURE — 88307 TISSUE EXAM BY PATHOLOGIST: CPT | Performed by: STUDENT IN AN ORGANIZED HEALTH CARE EDUCATION/TRAINING PROGRAM

## 2025-03-27 PROCEDURE — 2500000001 HC RX 250 WO HCPCS SELF ADMINISTERED DRUGS (ALT 637 FOR MEDICARE OP): Performed by: NURSE PRACTITIONER

## 2025-03-27 PROCEDURE — 2720000007 HC OR 272 NO HCPCS: Performed by: OTOLARYNGOLOGY

## 2025-03-27 PROCEDURE — 99100 ANES PT EXTEME AGE<1 YR&>70: CPT | Performed by: STUDENT IN AN ORGANIZED HEALTH CARE EDUCATION/TRAINING PROGRAM

## 2025-03-27 PROCEDURE — 3700000001 HC GENERAL ANESTHESIA TIME - INITIAL BASE CHARGE: Performed by: OTOLARYNGOLOGY

## 2025-03-27 PROCEDURE — A42420 PR EXC PAROTD,TOTAL,DISSECT 5TH NERV: Performed by: STUDENT IN AN ORGANIZED HEALTH CARE EDUCATION/TRAINING PROGRAM

## 2025-03-27 PROCEDURE — 99231 SBSQ HOSP IP/OBS SF/LOW 25: CPT

## 2025-03-27 PROCEDURE — 3600000009 HC OR TIME - EACH INCREMENTAL 1 MINUTE - PROCEDURE LEVEL FOUR: Performed by: OTOLARYNGOLOGY

## 2025-03-27 PROCEDURE — 2500000005 HC RX 250 GENERAL PHARMACY W/O HCPCS: Performed by: ANESTHESIOLOGIST ASSISTANT

## 2025-03-27 PROCEDURE — 3700000002 HC GENERAL ANESTHESIA TIME - EACH INCREMENTAL 1 MINUTE: Performed by: OTOLARYNGOLOGY

## 2025-03-27 PROCEDURE — 3600000004 HC OR TIME - INITIAL BASE CHARGE - PROCEDURE LEVEL FOUR: Performed by: OTOLARYNGOLOGY

## 2025-03-27 RX ORDER — HYDROMORPHONE HYDROCHLORIDE 1 MG/ML
INJECTION, SOLUTION INTRAMUSCULAR; INTRAVENOUS; SUBCUTANEOUS AS NEEDED
Status: DISCONTINUED | OUTPATIENT
Start: 2025-03-27 | End: 2025-03-27

## 2025-03-27 RX ORDER — IPRATROPIUM BROMIDE 0.5 MG/2.5ML
500 SOLUTION RESPIRATORY (INHALATION) AS NEEDED
Status: DISCONTINUED | OUTPATIENT
Start: 2025-03-27 | End: 2025-03-27 | Stop reason: HOSPADM

## 2025-03-27 RX ORDER — LIDOCAINE HYDROCHLORIDE AND EPINEPHRINE 10; 10 UG/ML; MG/ML
INJECTION, SOLUTION INFILTRATION; PERINEURAL AS NEEDED
Status: DISCONTINUED | OUTPATIENT
Start: 2025-03-27 | End: 2025-03-27 | Stop reason: HOSPADM

## 2025-03-27 RX ORDER — FENTANYL CITRATE 50 UG/ML
25 INJECTION, SOLUTION INTRAMUSCULAR; INTRAVENOUS EVERY 5 MIN PRN
Status: DISCONTINUED | OUTPATIENT
Start: 2025-03-27 | End: 2025-03-27 | Stop reason: HOSPADM

## 2025-03-27 RX ORDER — OXYCODONE HYDROCHLORIDE 5 MG/1
5 TABLET ORAL ONCE AS NEEDED
Status: DISCONTINUED | OUTPATIENT
Start: 2025-03-27 | End: 2025-03-27 | Stop reason: HOSPADM

## 2025-03-27 RX ORDER — DIPHENHYDRAMINE HYDROCHLORIDE 50 MG/ML
12.5 INJECTION, SOLUTION INTRAMUSCULAR; INTRAVENOUS ONCE AS NEEDED
Status: DISCONTINUED | OUTPATIENT
Start: 2025-03-27 | End: 2025-03-27 | Stop reason: HOSPADM

## 2025-03-27 RX ORDER — ASPIRIN 81 MG/1
81 TABLET ORAL ONCE
Status: COMPLETED | OUTPATIENT
Start: 2025-03-28 | End: 2025-03-28

## 2025-03-27 RX ORDER — OXYCODONE HYDROCHLORIDE 5 MG/1
5 TABLET ORAL EVERY 4 HOURS PRN
Status: DISCONTINUED | OUTPATIENT
Start: 2025-03-27 | End: 2025-03-28 | Stop reason: HOSPADM

## 2025-03-27 RX ORDER — ALBUTEROL SULFATE 0.83 MG/ML
2.5 SOLUTION RESPIRATORY (INHALATION) ONCE AS NEEDED
Status: DISCONTINUED | OUTPATIENT
Start: 2025-03-27 | End: 2025-03-27 | Stop reason: HOSPADM

## 2025-03-27 RX ORDER — ONDANSETRON HYDROCHLORIDE 2 MG/ML
4 INJECTION, SOLUTION INTRAVENOUS ONCE AS NEEDED
Status: DISCONTINUED | OUTPATIENT
Start: 2025-03-27 | End: 2025-03-27 | Stop reason: HOSPADM

## 2025-03-27 RX ORDER — PHENYLEPHRINE HCL IN 0.9% NACL 1 MG/10 ML
SYRINGE (ML) INTRAVENOUS AS NEEDED
Status: DISCONTINUED | OUTPATIENT
Start: 2025-03-27 | End: 2025-03-27

## 2025-03-27 RX ORDER — FENTANYL CITRATE 50 UG/ML
INJECTION, SOLUTION INTRAMUSCULAR; INTRAVENOUS AS NEEDED
Status: DISCONTINUED | OUTPATIENT
Start: 2025-03-27 | End: 2025-03-27

## 2025-03-27 RX ORDER — MEPERIDINE HYDROCHLORIDE 25 MG/ML
12.5 INJECTION INTRAMUSCULAR; INTRAVENOUS; SUBCUTANEOUS EVERY 10 MIN PRN
Status: DISCONTINUED | OUTPATIENT
Start: 2025-03-27 | End: 2025-03-27 | Stop reason: HOSPADM

## 2025-03-27 RX ORDER — ONDANSETRON HYDROCHLORIDE 2 MG/ML
4 INJECTION, SOLUTION INTRAVENOUS EVERY 8 HOURS PRN
Status: DISCONTINUED | OUTPATIENT
Start: 2025-03-27 | End: 2025-03-28 | Stop reason: HOSPADM

## 2025-03-27 RX ORDER — TRAMADOL HYDROCHLORIDE 50 MG/1
50 TABLET ORAL EVERY 6 HOURS PRN
Qty: 12 TABLET | Refills: 0 | Status: SHIPPED | OUTPATIENT
Start: 2025-03-27 | End: 2025-04-01

## 2025-03-27 RX ORDER — FENTANYL CITRATE 50 UG/ML
50 INJECTION, SOLUTION INTRAMUSCULAR; INTRAVENOUS EVERY 5 MIN PRN
Status: DISCONTINUED | OUTPATIENT
Start: 2025-03-27 | End: 2025-03-27 | Stop reason: HOSPADM

## 2025-03-27 RX ORDER — HYDRALAZINE HYDROCHLORIDE 20 MG/ML
5 INJECTION INTRAMUSCULAR; INTRAVENOUS EVERY 30 MIN PRN
Status: DISCONTINUED | OUTPATIENT
Start: 2025-03-27 | End: 2025-03-27 | Stop reason: HOSPADM

## 2025-03-27 RX ORDER — LIDOCAINE HYDROCHLORIDE 20 MG/ML
INJECTION, SOLUTION EPIDURAL; INFILTRATION; INTRACAUDAL; PERINEURAL AS NEEDED
Status: DISCONTINUED | OUTPATIENT
Start: 2025-03-27 | End: 2025-03-27

## 2025-03-27 RX ORDER — SODIUM CHLORIDE, SODIUM LACTATE, POTASSIUM CHLORIDE, CALCIUM CHLORIDE 600; 310; 30; 20 MG/100ML; MG/100ML; MG/100ML; MG/100ML
INJECTION, SOLUTION INTRAVENOUS CONTINUOUS PRN
Status: DISCONTINUED | OUTPATIENT
Start: 2025-03-27 | End: 2025-03-27

## 2025-03-27 RX ORDER — SODIUM CHLORIDE, SODIUM LACTATE, POTASSIUM CHLORIDE, CALCIUM CHLORIDE 600; 310; 30; 20 MG/100ML; MG/100ML; MG/100ML; MG/100ML
100 INJECTION, SOLUTION INTRAVENOUS CONTINUOUS
Status: DISCONTINUED | OUTPATIENT
Start: 2025-03-27 | End: 2025-03-27 | Stop reason: HOSPADM

## 2025-03-27 RX ORDER — ACETAMINOPHEN 325 MG/1
650 TABLET ORAL EVERY 4 HOURS PRN
Status: DISCONTINUED | OUTPATIENT
Start: 2025-03-27 | End: 2025-03-28 | Stop reason: HOSPADM

## 2025-03-27 RX ORDER — ENOXAPARIN SODIUM 100 MG/ML
40 INJECTION SUBCUTANEOUS EVERY 24 HOURS
Status: DISCONTINUED | OUTPATIENT
Start: 2025-03-27 | End: 2025-03-28 | Stop reason: HOSPADM

## 2025-03-27 RX ORDER — LIDOCAINE HYDROCHLORIDE 40 MG/ML
SOLUTION TOPICAL AS NEEDED
Status: DISCONTINUED | OUTPATIENT
Start: 2025-03-27 | End: 2025-03-27

## 2025-03-27 RX ORDER — ONDANSETRON 4 MG/1
4 TABLET, ORALLY DISINTEGRATING ORAL EVERY 8 HOURS PRN
Status: DISCONTINUED | OUTPATIENT
Start: 2025-03-27 | End: 2025-03-28 | Stop reason: HOSPADM

## 2025-03-27 RX ORDER — ONDANSETRON HYDROCHLORIDE 2 MG/ML
INJECTION, SOLUTION INTRAVENOUS AS NEEDED
Status: DISCONTINUED | OUTPATIENT
Start: 2025-03-27 | End: 2025-03-27

## 2025-03-27 RX ORDER — ROCURONIUM BROMIDE 10 MG/ML
INJECTION, SOLUTION INTRAVENOUS AS NEEDED
Status: DISCONTINUED | OUTPATIENT
Start: 2025-03-27 | End: 2025-03-27

## 2025-03-27 RX ORDER — PROPOFOL 10 MG/ML
INJECTION, EMULSION INTRAVENOUS AS NEEDED
Status: DISCONTINUED | OUTPATIENT
Start: 2025-03-27 | End: 2025-03-27

## 2025-03-27 RX ORDER — PROCHLORPERAZINE EDISYLATE 5 MG/ML
5 INJECTION INTRAMUSCULAR; INTRAVENOUS ONCE AS NEEDED
Status: DISCONTINUED | OUTPATIENT
Start: 2025-03-27 | End: 2025-03-27 | Stop reason: HOSPADM

## 2025-03-27 RX ORDER — SODIUM CHLORIDE 0.9 G/100ML
INJECTION, SOLUTION IRRIGATION AS NEEDED
Status: DISCONTINUED | OUTPATIENT
Start: 2025-03-27 | End: 2025-03-27 | Stop reason: HOSPADM

## 2025-03-27 RX ORDER — POLYETHYLENE GLYCOL 3350 17 G/17G
17 POWDER, FOR SOLUTION ORAL DAILY
Status: DISCONTINUED | OUTPATIENT
Start: 2025-03-27 | End: 2025-03-28 | Stop reason: HOSPADM

## 2025-03-27 RX ORDER — LABETALOL HYDROCHLORIDE 5 MG/ML
5 INJECTION, SOLUTION INTRAVENOUS ONCE AS NEEDED
Status: DISCONTINUED | OUTPATIENT
Start: 2025-03-27 | End: 2025-03-27 | Stop reason: HOSPADM

## 2025-03-27 RX ADMIN — DEXAMETHASONE SODIUM PHOSPHATE 10 MG: 4 INJECTION, SOLUTION INTRAMUSCULAR; INTRAVENOUS at 11:21

## 2025-03-27 RX ADMIN — ENOXAPARIN SODIUM 40 MG: 40 INJECTION SUBCUTANEOUS at 21:01

## 2025-03-27 RX ADMIN — Medication 100 MCG: at 11:31

## 2025-03-27 RX ADMIN — FENTANYL CITRATE 100 MCG: 50 INJECTION, SOLUTION INTRAMUSCULAR; INTRAVENOUS at 11:03

## 2025-03-27 RX ADMIN — Medication 100 MCG: at 12:35

## 2025-03-27 RX ADMIN — Medication 100 MCG: at 11:37

## 2025-03-27 RX ADMIN — HYDROMORPHONE HYDROCHLORIDE 0.2 MG: 1 INJECTION, SOLUTION INTRAMUSCULAR; INTRAVENOUS; SUBCUTANEOUS at 12:57

## 2025-03-27 RX ADMIN — ONDANSETRON 4 MG: 2 INJECTION, SOLUTION INTRAMUSCULAR; INTRAVENOUS at 12:47

## 2025-03-27 RX ADMIN — SODIUM CHLORIDE, POTASSIUM CHLORIDE, SODIUM LACTATE AND CALCIUM CHLORIDE: 600; 310; 30; 20 INJECTION, SOLUTION INTRAVENOUS at 10:52

## 2025-03-27 RX ADMIN — LIDOCAINE HYDROCHLORIDE 4 ML: 40 SOLUTION TOPICAL at 11:03

## 2025-03-27 RX ADMIN — Medication 100 MCG: at 11:27

## 2025-03-27 RX ADMIN — HYDROMORPHONE HYDROCHLORIDE 0.5 MG: 1 INJECTION, SOLUTION INTRAMUSCULAR; INTRAVENOUS; SUBCUTANEOUS at 12:47

## 2025-03-27 RX ADMIN — HYDROMORPHONE HYDROCHLORIDE 0.3 MG: 1 INJECTION, SOLUTION INTRAMUSCULAR; INTRAVENOUS; SUBCUTANEOUS at 13:00

## 2025-03-27 RX ADMIN — LIDOCAINE HYDROCHLORIDE 100 MG: 20 INJECTION, SOLUTION EPIDURAL; INFILTRATION; INTRACAUDAL; PERINEURAL at 11:03

## 2025-03-27 RX ADMIN — PROPOFOL 200 MG: 10 INJECTION, EMULSION INTRAVENOUS at 11:03

## 2025-03-27 RX ADMIN — SODIUM CHLORIDE, POTASSIUM CHLORIDE, SODIUM LACTATE AND CALCIUM CHLORIDE: 600; 310; 30; 20 INJECTION, SOLUTION INTRAVENOUS at 13:01

## 2025-03-27 RX ADMIN — WHITE PETROLATUM 57.7 %-MINERAL OIL 31.9 % EYE OINTMENT 1 APPLICATION: at 11:15

## 2025-03-27 RX ADMIN — ROCURONIUM BROMIDE 40 MG: 10 INJECTION INTRAVENOUS at 11:03

## 2025-03-27 RX ADMIN — ACETAMINOPHEN 650 MG: 325 TABLET, FILM COATED ORAL at 18:24

## 2025-03-27 RX ADMIN — Medication 100 MCG: at 11:54

## 2025-03-27 RX ADMIN — SUGAMMADEX 200 MG: 100 INJECTION, SOLUTION INTRAVENOUS at 12:48

## 2025-03-27 RX ADMIN — Medication 0.05 MCG/KG/MIN: at 11:15

## 2025-03-27 SDOH — SOCIAL STABILITY: SOCIAL INSECURITY: WITHIN THE LAST YEAR, HAVE YOU BEEN AFRAID OF YOUR PARTNER OR EX-PARTNER?: NO

## 2025-03-27 SDOH — SOCIAL STABILITY: SOCIAL INSECURITY
WITHIN THE LAST YEAR, HAVE YOU BEEN RAPED OR FORCED TO HAVE ANY KIND OF SEXUAL ACTIVITY BY YOUR PARTNER OR EX-PARTNER?: NO

## 2025-03-27 SDOH — SOCIAL STABILITY: SOCIAL INSECURITY: ARE THERE ANY APPARENT SIGNS OF INJURIES/BEHAVIORS THAT COULD BE RELATED TO ABUSE/NEGLECT?: NO

## 2025-03-27 SDOH — SOCIAL STABILITY: SOCIAL INSECURITY: HAS ANYONE EVER THREATENED TO HURT YOUR FAMILY OR YOUR PETS?: NO

## 2025-03-27 SDOH — SOCIAL STABILITY: SOCIAL INSECURITY: WERE YOU ABLE TO COMPLETE ALL THE BEHAVIORAL HEALTH SCREENINGS?: NO

## 2025-03-27 SDOH — ECONOMIC STABILITY: FOOD INSECURITY: WITHIN THE PAST 12 MONTHS, YOU WORRIED THAT YOUR FOOD WOULD RUN OUT BEFORE YOU GOT THE MONEY TO BUY MORE.: NEVER TRUE

## 2025-03-27 SDOH — ECONOMIC STABILITY: INCOME INSECURITY: IN THE PAST 12 MONTHS HAS THE ELECTRIC, GAS, OIL, OR WATER COMPANY THREATENED TO SHUT OFF SERVICES IN YOUR HOME?: NO

## 2025-03-27 SDOH — SOCIAL STABILITY: SOCIAL INSECURITY: WITHIN THE LAST YEAR, HAVE YOU BEEN HUMILIATED OR EMOTIONALLY ABUSED IN OTHER WAYS BY YOUR PARTNER OR EX-PARTNER?: NO

## 2025-03-27 SDOH — SOCIAL STABILITY: SOCIAL INSECURITY
WITHIN THE LAST YEAR, HAVE YOU BEEN KICKED, HIT, SLAPPED, OR OTHERWISE PHYSICALLY HURT BY YOUR PARTNER OR EX-PARTNER?: NO

## 2025-03-27 SDOH — SOCIAL STABILITY: SOCIAL INSECURITY: DO YOU FEEL UNSAFE GOING BACK TO THE PLACE WHERE YOU ARE LIVING?: NO

## 2025-03-27 SDOH — SOCIAL STABILITY: SOCIAL INSECURITY: ARE YOU OR HAVE YOU BEEN THREATENED OR ABUSED PHYSICALLY, EMOTIONALLY, OR SEXUALLY BY ANYONE?: NO

## 2025-03-27 SDOH — SOCIAL STABILITY: SOCIAL INSECURITY: DO YOU FEEL ANYONE HAS EXPLOITED OR TAKEN ADVANTAGE OF YOU FINANCIALLY OR OF YOUR PERSONAL PROPERTY?: NO

## 2025-03-27 SDOH — ECONOMIC STABILITY: FOOD INSECURITY: WITHIN THE PAST 12 MONTHS, THE FOOD YOU BOUGHT JUST DIDN'T LAST AND YOU DIDN'T HAVE MONEY TO GET MORE.: NEVER TRUE

## 2025-03-27 SDOH — SOCIAL STABILITY: SOCIAL INSECURITY: DOES ANYONE TRY TO KEEP YOU FROM HAVING/CONTACTING OTHER FRIENDS OR DOING THINGS OUTSIDE YOUR HOME?: NO

## 2025-03-27 SDOH — SOCIAL STABILITY: SOCIAL INSECURITY: ABUSE: ADULT

## 2025-03-27 SDOH — SOCIAL STABILITY: SOCIAL INSECURITY: HAVE YOU HAD THOUGHTS OF HARMING ANYONE ELSE?: NO

## 2025-03-27 ASSESSMENT — PAIN - FUNCTIONAL ASSESSMENT
PAIN_FUNCTIONAL_ASSESSMENT: 0-10

## 2025-03-27 ASSESSMENT — COGNITIVE AND FUNCTIONAL STATUS - GENERAL
DAILY ACTIVITIY SCORE: 24
MOBILITY SCORE: 24
DAILY ACTIVITIY SCORE: 24
MOBILITY SCORE: 24
PATIENT BASELINE BEDBOUND: NO

## 2025-03-27 ASSESSMENT — LIFESTYLE VARIABLES
HOW MANY STANDARD DRINKS CONTAINING ALCOHOL DO YOU HAVE ON A TYPICAL DAY: PATIENT DOES NOT DRINK
HOW OFTEN DO YOU HAVE A DRINK CONTAINING ALCOHOL: NEVER
AUDIT-C TOTAL SCORE: 0
HOW OFTEN DO YOU HAVE 6 OR MORE DRINKS ON ONE OCCASION: NEVER
SKIP TO QUESTIONS 9-10: 1
AUDIT-C TOTAL SCORE: 0

## 2025-03-27 ASSESSMENT — ACTIVITIES OF DAILY LIVING (ADL)
JUDGMENT_ADEQUATE_SAFELY_COMPLETE_DAILY_ACTIVITIES: YES
FEEDING YOURSELF: INDEPENDENT
LACK_OF_TRANSPORTATION: NO
GROOMING: INDEPENDENT
HEARING - LEFT EAR: FUNCTIONAL
PATIENT'S MEMORY ADEQUATE TO SAFELY COMPLETE DAILY ACTIVITIES?: YES
ADEQUATE_TO_COMPLETE_ADL: YES
WALKS IN HOME: INDEPENDENT
BATHING: INDEPENDENT
DRESSING YOURSELF: INDEPENDENT
TOILETING: INDEPENDENT
HEARING - RIGHT EAR: FUNCTIONAL

## 2025-03-27 ASSESSMENT — PAIN SCALES - GENERAL
PAINLEVEL_OUTOF10: 0 - NO PAIN
PAINLEVEL_OUTOF10: 2
PAINLEVEL_OUTOF10: 0 - NO PAIN
PAINLEVEL_OUTOF10: 1
PAINLEVEL_OUTOF10: 3

## 2025-03-27 ASSESSMENT — COLUMBIA-SUICIDE SEVERITY RATING SCALE - C-SSRS
6. HAVE YOU EVER DONE ANYTHING, STARTED TO DO ANYTHING, OR PREPARED TO DO ANYTHING TO END YOUR LIFE?: NO
2. HAVE YOU ACTUALLY HAD ANY THOUGHTS OF KILLING YOURSELF?: NO
1. IN THE PAST MONTH, HAVE YOU WISHED YOU WERE DEAD OR WISHED YOU COULD GO TO SLEEP AND NOT WAKE UP?: NO

## 2025-03-27 ASSESSMENT — PAIN DESCRIPTION - DESCRIPTORS: DESCRIPTORS: DULL

## 2025-03-27 NOTE — CARE PLAN
The patient's goals for the shift include      The clinical goals for the shift include pain management    Problem: Pain - Adult  Goal: Verbalizes/displays adequate comfort level or baseline comfort level  Outcome: Progressing     Problem: Safety - Adult  Goal: Free from fall injury  Outcome: Progressing     Problem: Discharge Planning  Goal: Discharge to home or other facility with appropriate resources  Outcome: Progressing     Problem: Chronic Conditions and Co-morbidities  Goal: Patient's chronic conditions and co-morbidity symptoms are monitored and maintained or improved  Outcome: Progressing     Problem: Chronic Conditions and Co-morbidities  Goal: Patient's chronic conditions and co-morbidity symptoms are monitored and maintained or improved  Outcome: Progressing     Problem: Nutrition  Goal: Nutrient intake appropriate for maintaining nutritional needs  Outcome: Progressing

## 2025-03-27 NOTE — ANESTHESIA PROCEDURE NOTES
Airway  Date/Time: 3/27/2025 11:09 AM  Urgency: elective      Staffing  Performed: MAUREEN   Authorized by: Kranthi Mena MD    Performed by: MAUREEN Dinh  Patient location during procedure: OR    Indications and Patient Condition  Indications for airway management: anesthesia  Sedation level: deep  Preoxygenated: yes  Patient position: sniffing  Mask difficulty assessment: 2 - vent by mask + OA or adjuvant +/- NMBA    Final Airway Details  Final airway type: endotracheal airway      Successful airway: ETT     Successful intubation technique: direct laryngoscopy  Facilitating devices/methods: intubating stylet  Endotracheal tube insertion site: oral  Blade size: #4  ETT size (mm): 7.5  Cormack-Lehane Classification: grade I - full view of glottis  Placement verified by: chest auscultation and capnometry   Measured from: lips  Number of attempts at approach: 1

## 2025-03-27 NOTE — POST-PROCEDURE NOTE
Mr. Sampson is a 75 year old male who is POD #0 from a left parotid gland excision. He reports feeling well post-operatively. Denies any pain or current nausea/vomiting. He has urinated since OR.     PE:  Constitutional: A&Ox3, calm and cooperative, NAD.  Eyes: PERRL, clear sclera.  ENMT: Moist mucous membranes.  Head/Neck: Neck supple. Left sided surgical dressing C/D/I without shadowing.   Cardiovascular: Normal rate.  Respiratory/Thorax: Unlabored breathing, no stridor.  Genitourinary: Voiding independently without difficulty.  Musculoskeletal: ROM intact.  Neurological: A&Ox3, No focal deficits.  Psychological: Appropriate mood and behavior.  Skin: Warm and dry.    Radiology and labs reviewed. VSS, afebrile.    Plan:   - Diet: Regular   - Continue current pain regimen   - PRN antiemetic   - DVT Proph: SCDs/ ambulate/ Lovenox  - Bowel regimen: Miralax  - Monitor VS every 4 hours   - Labs ordered for AM   - IS every hour while awake   - Encourage ambulation / OOB as tolerated   - Monitor surgical site for drainage, erythema, excessive bruising   - F/u with Dr. Durand outpatient once d/c from hospital     Dispo: Pain control as needed. OOB as able. Discharge tomorrow.    Total time spent 25 minutes, and greater than 50% of time was spent in counseling/coordination of care.

## 2025-03-27 NOTE — OP NOTE
Left Parotid Gland Excision (L) Operative Note     Date: 3/27/2025  OR Location: [facility] OR    Name: Clyde Sampson Jr., : 1950, Age: 75 y.o., MRN: [id number], Sex: male    Diagnosis  Pre-op Diagnosis      * Mass of left parotid gland [K11.8] Post-op Diagnosis     * Mass of left parotid gland [K11.8]     Procedures  Left Parotid Gland Excision  32971 - MT EXC PRTD TIMOTHY/PRTD GLND TOT DSJ&PRSRV FACIAL NR      Surgeons      * Sravan Durand - Primary    Resident/Fellow/Other Assistant:  Surgeons and Role:  * No surgeons found with a matching role *    Staff:   Circulator: Evangelina Morales Person: Evelyn Morales Person: Tiesha  Surgical Assistant: Oziel Clemons Scrub: Charleen Clemons Circulator: Kayli    Anesthesia Staff: Anesthesiologist: Kranthi Mena MD  C-AA: MAUREEN Dinh; MAUREEN Arndt    Procedure Summary  Anesthesia: General  ASA: III  Estimated Blood Loss: 1mL  Intra-op Medications:   Administrations occurring from 1100 to 1330 on 25:   Medication Name Total Dose   lidocaine-epinephrine (Xylocaine W/EPI) 1 %-1:100,000 injection 4 mL   sodium chloride 0.9 % irrigation solution 1,000 mL   thrombin (Human)-fibrinogen-aprotinin-calcium (Tisseel) 4 mL 4 mL   dexAMETHasone (Decadron) injection 4 mg/mL 10 mg   fentaNYL (Sublimaze) injection 50 mcg/mL 100 mcg   HYDROmorphone (Dilaudid) injection 1 mg/mL 1 mg   lactated Ringer's infusion Cannot be calculated   lidocaine (LTA Kit) for intubation 4 mL   lidocaine PF (Xylocaine-MPF) local injection 2 % 100 mg   ondansetron (Zofran) 2 mg/mL injection 4 mg   phenylephrine 100 mcg/mL syringe 10 mL (prefilled) 500 mcg   propofol (Diprivan) injection 10 mg/mL 200 mg   remifentanil (Ultiva) 1,000 mcg in sodium chloride 0.9% 50 mL (20 mcg/mL) infusion 0.43 mg   rocuronium (ZeMuron) 50 mg/5 mL injection 40 mg   sugammadex (Bridion) 200 mg/2 mL injection 200 mg   white petrolatum ophthalmic ointment 1 Application              Anesthesia Record                Intraprocedure I/O Totals          Intake    Remifentanil Drip 0.00 mL    The total shown is the total volume documented since Anesthesia Start was filed.    lactated Ringer's 1000.00 mL    Total Intake 1000 mL          Specimen:   ID Type Source Tests Collected by Time   1 : LEFT PAROTID TUMOR SINGLE LONG STITCH INFERIOR, DOUBLE LONG SUPERIOR ADJACENT TO NERVE, DOUBLE SHORT SUPERFICIAL Tissue PAROTID RESECTION LEFT SURGICAL PATHOLOGY EXAM Sravan Durand MD 3/27/2025 1229                 Drains and/or Catheters: * None in log *    Tourniquet Times:         Implants:     Findings:      Indications: Clyde Sampson Jr. is an 75 y.o. male who is having surgery for Mass of left parotid gland [K11.8].      The patient was seen in the preoperative area. The risks, benefits, complications, treatment options, non-operative alternatives, expected recovery and outcomes were discussed with the patient. The possibilities of reaction to medication, pulmonary aspiration, injury to surrounding structures, bleeding, recurrent infection, the need for additional procedures, failure to diagnose a condition, and creating a complication requiring transfusion or operation were discussed with the patient. The patient concurred with the proposed plan, giving informed consent.  The site of surgery was properly noted/marked if necessary per policy. The patient has been actively warmed in preoperative area. Preoperative antibiotics are not indicated. Venous thrombosis prophylaxis have been ordered including bilateral sequential compression devices    Procedure Details: Patient was brought to the operating room placed supine on the operating table  Proper timeout was performed.  Patient was turned 90 degrees secondary timeout was performed after the patient was prepped and draped in a standard Grabiel incision was outlined and infiltrated with 1% lidocaine with 1-100,000 epinephrine.  15 blade knife was utilized to make a skin incision  proper flaps was elevated superiorly sternocleidomastoid muscle and the greater regular nerve were identified posteriorly and traced superiorly with branches being able to be preserved.  Posterior belly of digastric was identified as was the angle of the mandible lower division of the facial nerve was identified and traced retrograde allowing me to separate the large tumor from the superficial and subsequently the deep surface of the inferior division.  This was taken with a small rim of parotid tissue however more superiorly it was intimately associated with the nerve and therefore no parotid tissue was taken on that portion of the mass.  Specimen was appropriately labeled and sent to pathology for analysis.  All branches of the nerve stimulated with level current.  The wound was copiously irrigated.  Hemostasis was confirmed.  Valsalva confirmed adequate hemostasis.  Tisseel was used and layered closure took place.  Patient tolerated the procedure well.  Complications:  None; patient tolerated the procedure well.    Disposition: PACU - hemodynamically stable.  Condition: stable         Task Performed by RNFA or Surgical Assistant:  Due to the no resident being available surgical first assist was required for appropriate retraction countertraction and assistance to allow for safe and efficient procedure.          Additional Details:      Attending Attestation:     Sravan Durand  Phone Number: [phone]

## 2025-03-27 NOTE — ANESTHESIA POSTPROCEDURE EVALUATION
Patient: Clyde Sampson Jr.    Procedure Summary       Date: 03/27/25 Room / Location: [facility] OR 04 / [facility]    Anesthesia Start: 1052 Anesthesia Stop: 1326    Procedure: Left Parotid Gland Excision (Left) Diagnosis:       Mass of left parotid gland      (Mass of left parotid gland [K11.8])    Surgeons: Sravan Durand MD Responsible Provider: Kranthi Mena MD    Anesthesia Type: general ASA Status: 3            Anesthesia Type: general    Vitals Value Taken Time   /103 03/27/25 1346   Temp 36.4 °C (97.5 °F) 03/27/25 1317   Pulse 85 03/27/25 1346   Resp 16 03/27/25 1330   SpO2 91 % 03/27/25 1344   Vitals shown include unfiled device data.    Anesthesia Post Evaluation    Patient location during evaluation: PACU  Patient participation: complete - patient participated  Level of consciousness: awake  Pain management: adequate  Multimodal analgesia pain management approach  Airway patency: patent  Cardiovascular status: acceptable and hemodynamically stable  Respiratory status: acceptable and spontaneous ventilation  Hydration status: euvolemic  Postoperative Nausea and Vomiting: none        There were no known notable events for this encounter.

## 2025-03-27 NOTE — PERIOPERATIVE NURSING NOTE
1317 Arrival to PACU. Drowsy but easily arousable. Denies pain. Left neck dressing cdi. Excoriation noted on sacral area, wound care consulted.    1330 Dr. Durand at bedside to speak to pt.    1340 Wound care team at bedside to address sacral excoriation.     1400 Patient's ride says she does not drive. Called patient's daughter and she also does not drive. Patient will need [facility]. He drove himself to surgery this morning.  Dr. Durand contacted for orders.    1505 Transported to room 714 via stretcher.

## 2025-03-27 NOTE — ANESTHESIA PREPROCEDURE EVALUATION
Patient: Clyde Sampson Jr.    Procedure Information       Date/Time: 03/27/25 1100    Procedure: Left Parotid Gland Excision (Left)    Location: [facility] OR 04 / [facility]    Surgeons: Sravan Durand MD            Relevant Problems   Anesthesia (within normal limits)      Cardiac  2021 infrarenal AAA 5cm, unsure if it was followed    Takes  for Afib prescribed by PCP   (+) Abdominal aortic aneurysm (CMS-HCC)   (+) Paroxysmal atrial fibrillation (Multi)   (-) History of coronary artery bypass graft   (-) Pacemaker      Pulmonary   (+) Pulmonary embolus   (-) Asthma   (-) Chronic obstructive pulmonary disease (Multi)   (-) STEPH (obstructive sleep apnea)      Neuro   (-) CVA (cerebral vascular accident) (Multi)   (-) Seizures (Multi)      Endocrine   (+) Type 2 diabetes mellitus without complication, without long-term current use of insulin (Multi)      Hematology   (-) Coagulopathy (Multi)      Musculoskeletal   (+) Foraminal stenosis of lumbar region   (+) Osteoarthritis of lumbar spine       Clinical information reviewed:   Tobacco  Allergies  Meds   Med Hx  Surg Hx   Fam Hx  Soc Hx        NPO Detail:  NPO/Void Status  Date of Last Liquid: 03/26/25  Time of Last Liquid: 1600  Date of Last Solid: 03/26/25  Time of Last Solid: 1500  Last Intake Type: Clear fluids  Time of Last Void: 0700         Physical Exam    Airway  Mallampati: I  TM distance: >3 FB  Neck ROM: full     Cardiovascular    Dental   Comments: Poor dentition, missing teeth   Pulmonary    Abdominal            Anesthesia Plan    History of general anesthesia?: yes  History of complications of general anesthesia?: no    ASA 3     general     intravenous induction   Postoperative administration of opioids is intended.  Anesthetic plan and risks discussed with patient.

## 2025-03-28 ENCOUNTER — TELEPHONE (OUTPATIENT)
Dept: OTOLARYNGOLOGY | Facility: HOSPITAL | Age: 75
End: 2025-03-28
Payer: MEDICARE

## 2025-03-28 VITALS
HEIGHT: 69 IN | TEMPERATURE: 97.2 F | HEART RATE: 79 BPM | RESPIRATION RATE: 16 BRPM | WEIGHT: 165.34 LBS | SYSTOLIC BLOOD PRESSURE: 112 MMHG | OXYGEN SATURATION: 96 % | DIASTOLIC BLOOD PRESSURE: 68 MMHG | BODY MASS INDEX: 24.49 KG/M2

## 2025-03-28 LAB
ANION GAP SERPL CALC-SCNC: 12 MMOL/L (ref 10–20)
BUN SERPL-MCNC: 23 MG/DL (ref 6–23)
CALCIUM SERPL-MCNC: 9 MG/DL (ref 8.6–10.3)
CHLORIDE SERPL-SCNC: 101 MMOL/L (ref 98–107)
CO2 SERPL-SCNC: 28 MMOL/L (ref 21–32)
CREAT SERPL-MCNC: 0.99 MG/DL (ref 0.5–1.3)
EGFRCR SERPLBLD CKD-EPI 2021: 80 ML/MIN/1.73M*2
ERYTHROCYTE [DISTWIDTH] IN BLOOD BY AUTOMATED COUNT: 12.5 % (ref 11.5–14.5)
GLUCOSE SERPL-MCNC: 86 MG/DL (ref 74–99)
HCT VFR BLD AUTO: 43 % (ref 41–52)
HGB BLD-MCNC: 14.9 G/DL (ref 13.5–17.5)
MCH RBC QN AUTO: 30.7 PG (ref 26–34)
MCHC RBC AUTO-ENTMCNC: 34.7 G/DL (ref 32–36)
MCV RBC AUTO: 89 FL (ref 80–100)
NRBC BLD-RTO: 0 /100 WBCS (ref 0–0)
PLATELET # BLD AUTO: 201 X10*3/UL (ref 150–450)
POTASSIUM SERPL-SCNC: 4.3 MMOL/L (ref 3.5–5.3)
RBC # BLD AUTO: 4.86 X10*6/UL (ref 4.5–5.9)
SODIUM SERPL-SCNC: 137 MMOL/L (ref 136–145)
WBC # BLD AUTO: 11.8 X10*3/UL (ref 4.4–11.3)

## 2025-03-28 PROCEDURE — 2500000001 HC RX 250 WO HCPCS SELF ADMINISTERED DRUGS (ALT 637 FOR MEDICARE OP): Performed by: NURSE PRACTITIONER

## 2025-03-28 PROCEDURE — 7100000011 HC EXTENDED STAY RECOVERY HOURLY - NURSING UNIT

## 2025-03-28 PROCEDURE — 36415 COLL VENOUS BLD VENIPUNCTURE: CPT

## 2025-03-28 PROCEDURE — 80048 BASIC METABOLIC PNL TOTAL CA: CPT

## 2025-03-28 PROCEDURE — 85027 COMPLETE CBC AUTOMATED: CPT

## 2025-03-28 RX ADMIN — ASPIRIN 81 MG: 81 TABLET, COATED ORAL at 09:08

## 2025-03-28 ASSESSMENT — COGNITIVE AND FUNCTIONAL STATUS - GENERAL
MOBILITY SCORE: 24
DAILY ACTIVITIY SCORE: 24

## 2025-03-28 ASSESSMENT — PAIN - FUNCTIONAL ASSESSMENT: PAIN_FUNCTIONAL_ASSESSMENT: 0-10

## 2025-03-28 ASSESSMENT — PAIN SCALES - GENERAL: PAINLEVEL_OUTOF10: 0 - NO PAIN

## 2025-03-28 NOTE — DISCHARGE INSTRUCTIONS
[facility] DEPARTMENT OF OTOLARYNGOLOGY (ENT): PAROTIDECTOMY POST-OPERATIVE INSTRUCTIONS    Operations performed:   Left Parotidectomy     Treatment/wound care:   Keep area(s) clean and dry.   It is okay to shower 48 hours after time of surgery.    Do not scrub wound(s), pat dry.    Do not submerge wound(s) in standing water until seen in followup (no tub bathing, swimming, or hot tubs).    Please visually inspect your wound(s) at least once daily.  If the wound(s) are in a difficult to see location, please use a mirror or have someone else assist with visual inspection.    If you have sutures that you can see outside of the skin or staples:  Please have staples/sutures removed in our clinic 10-14 days after the date of surgery.  Do not remove the staples/sutures on your own.  Return sooner or call if wound(s) or surrounding area have increased swelling, pain, warmth, redness, or drainage that is thick, yellow and/or green.    If you see white bandages on your neck:  You have steri-strips (small paper tape) along your incision. You can shower, pat dry; do not soak in a tub.  The steri-strips will fall off on their own; do not peel them off.    Expected Post-Surgical Symptoms:  You may experience neck pain and a hoarse/weak voice. These symptoms are often temporary and may be due to irritation from the breathing tube that's inserted during surgery. Swallowing difficulties due to pain for several days.     You may also experience some weakness of the motion of your face. This is most likely temporary and will be monitored closely by your surgeon. You can expect function to return within several weeks but may take up to 6-12 months for full recovery in some cases. In some cases, the nerve that controls motion in your face has to be surgical removed, if this is the case the function will not return on that side and other procedures can be performed in the future for facial rehabilitation. Your surgeon will  discuss this with you if removal was necessary during your procedure.     Pain Control:    Tramadol can be taken as prescribed as needed for breakthrough pain.      Activity after Discharge:   When you go home, you can usually return to your regular activities.  Avoid strenuous activities, such as bicycle riding, jogging, weight lifting, or aerobic exercise, for at least 2 weeks.   No traveling for at least 7 days following surgery.  Do not drive or operate heavy machinery while taking narcotic pain medications as these medications can alter perception, impair judgement, and slow reaction times.    Diet: Resume normal diet.    Additional Instructions:   DO NOT take blood thinners, such as warfarin (Coumadin), clopidogrel (Plavix), or aspirin until instructed to do so from your surgeon.   Do not take aspirin, medicines that contain aspirin, or non-steroidal anti-inflammatory medicines such as ibuprofen (Advil, Motrin) or naproxen (Aleve) for 2 weeks after surgery unless otherwise directed by your doctor.  Take pain medicines exactly as directed.   If you are prescribed antibiotics, take them as directed. Do not stop taking them just because you feel better. You need to take the full course of antibiotics.

## 2025-03-28 NOTE — CARE PLAN
The patient's goals for the shift include      The clinical goals for the shift include maintain pt safety    Problem: Pain - Adult  Goal: Verbalizes/displays adequate comfort level or baseline comfort level  Outcome: Progressing     Problem: Safety - Adult  Goal: Free from fall injury  Outcome: Progressing     Problem: Discharge Planning  Goal: Discharge to home or other facility with appropriate resources  Outcome: Progressing     Problem: Chronic Conditions and Co-morbidities  Goal: Patient's chronic conditions and co-morbidity symptoms are monitored and maintained or improved  Outcome: Progressing     Problem: Nutrition  Goal: Nutrient intake appropriate for maintaining nutritional needs  Outcome: Progressing

## 2025-03-31 ASSESSMENT — PAIN SCALES - GENERAL: PAINLEVEL_OUTOF10: 1

## 2025-04-04 ENCOUNTER — APPOINTMENT (OUTPATIENT)
Dept: OTOLARYNGOLOGY | Facility: CLINIC | Age: 75
End: 2025-04-04
Payer: MEDICARE

## 2025-04-04 VITALS — HEIGHT: 69 IN | WEIGHT: 164 LBS | BODY MASS INDEX: 24.29 KG/M2

## 2025-04-04 DIAGNOSIS — K11.8 MASS OF LEFT PAROTID GLAND: Primary | ICD-10-CM

## 2025-04-04 LAB
LABORATORY COMMENT REPORT: NORMAL
PATH REPORT.FINAL DX SPEC: NORMAL
PATH REPORT.GROSS SPEC: NORMAL
PATH REPORT.RELEVANT HX SPEC: NORMAL
PATH REPORT.TOTAL CANCER: NORMAL

## 2025-04-04 PROCEDURE — 99024 POSTOP FOLLOW-UP VISIT: CPT | Performed by: OTOLARYNGOLOGY

## 2025-04-04 PROCEDURE — 1036F TOBACCO NON-USER: CPT | Performed by: OTOLARYNGOLOGY

## 2025-04-04 PROCEDURE — 1160F RVW MEDS BY RX/DR IN RCRD: CPT | Performed by: OTOLARYNGOLOGY

## 2025-04-04 PROCEDURE — 1159F MED LIST DOCD IN RCRD: CPT | Performed by: OTOLARYNGOLOGY

## 2025-04-04 PROCEDURE — 3008F BODY MASS INDEX DOCD: CPT | Performed by: OTOLARYNGOLOGY

## 2025-04-04 ASSESSMENT — PATIENT HEALTH QUESTIONNAIRE - PHQ9
2. FEELING DOWN, DEPRESSED OR HOPELESS: NOT AT ALL
1. LITTLE INTEREST OR PLEASURE IN DOING THINGS: NOT AT ALL
SUM OF ALL RESPONSES TO PHQ9 QUESTIONS 1 AND 2: 0

## 2025-04-04 NOTE — PROGRESS NOTES
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

## 2025-04-07 ENCOUNTER — TELEPHONE (OUTPATIENT)
Dept: OTOLARYNGOLOGY | Facility: CLINIC | Age: 75
End: 2025-04-07
Payer: MEDICARE

## 2025-04-07 NOTE — TELEPHONE ENCOUNTER
----- Message from Sravan Durand sent at 4/7/2025  1:15 PM EDT -----  Cait please let him know the good news of benign biopsy and we can see him in a few months to plan removal of the other side

## 2025-04-29 ENCOUNTER — TELEPHONE (OUTPATIENT)
Dept: PRIMARY CARE | Facility: CLINIC | Age: 75
End: 2025-04-29
Payer: MEDICARE

## 2025-04-29 DIAGNOSIS — D11.9 WARTHIN TUMOR: Primary | ICD-10-CM

## 2025-04-29 RX ORDER — MENTHOL AND ZINC OXIDE .44; 20.625 G/100G; G/100G
1 OINTMENT TOPICAL 4 TIMES DAILY PRN
Qty: 71 G | Refills: 1 | Status: SHIPPED | OUTPATIENT
Start: 2025-04-29

## 2025-04-29 NOTE — TELEPHONE ENCOUNTER
PT IS AWARE THAT THE  IS NOT IN THE OFFICE TODAY.    Clyde is calling stating he had surgery on 03/27/25, discharged on 03/28/25 at [facility] DX: Mass of left parotid gland with Dr. Sravan Durand. He is stating he was prescribed medication:    Calmoseptine 4 oz cream     for his rash on his rectum after surgery. He is stating he is out of this cream wanting to know if he can get a refill.    PHARMACY:  [facility] #[id number] [address] AT [facility] Phone: [phone]   Fax: [phone]          Clyde's # [phone]

## 2025-05-02 ENCOUNTER — APPOINTMENT (OUTPATIENT)
Dept: OTOLARYNGOLOGY | Facility: CLINIC | Age: 75
End: 2025-05-02
Payer: MEDICARE

## 2025-07-29 ENCOUNTER — TELEPHONE (OUTPATIENT)
Dept: PRIMARY CARE | Facility: CLINIC | Age: 75
End: 2025-07-29
Payer: MEDICARE

## 2025-07-29 DIAGNOSIS — G47.9 SLEEP DISTURBANCE: Primary | ICD-10-CM

## 2025-07-29 NOTE — TELEPHONE ENCOUNTER
PT STATES FOR THE LAST MONTH HE HAS BEEN HAVING TROUBLE SLEEPING    SAYS IT TAKES HIM A LONG TIME TO FALL ASLEEP AND ONCE HE IS HE SLEEP ITS ONLY FOR A FEW HOURS     PT HAS BEEN TAKING OTC MELATONIN TO HELP AND ITS NOT WORKING EITHER    PT IS WANTING TO KNOW IF DR WILL GIVE HIM SOMETHING TO HELP HIM SLEEP    ADVISED PT THAT DR IS OUT OF THE OFFICE AND THERE MIGHT BE A DELAY IN RESPONSE, PT VERBALIZED UNDERSTANDING    [facility] #[id number] [place] [id number]  Phone: [phone]  Fax: [phone]  [place] #: [id number]     [facility]  [phone]

## 2025-08-02 RX ORDER — TRAZODONE HYDROCHLORIDE 50 MG/1
50-75 TABLET ORAL NIGHTLY PRN
Qty: 45 TABLET | Refills: 5 | Status: SHIPPED | OUTPATIENT
Start: 2025-08-02 | End: 2026-08-02

## 2025-08-14 ENCOUNTER — APPOINTMENT (OUTPATIENT)
Dept: PRIMARY CARE | Facility: CLINIC | Age: 75
End: 2025-08-14
Payer: MEDICARE

## 2025-08-18 ENCOUNTER — OFFICE VISIT (OUTPATIENT)
Dept: PRIMARY CARE | Facility: CLINIC | Age: 75
End: 2025-08-18
Payer: MEDICARE

## 2025-08-18 VITALS
WEIGHT: 161.4 LBS | RESPIRATION RATE: 18 BRPM | BODY MASS INDEX: 23.83 KG/M2 | DIASTOLIC BLOOD PRESSURE: 72 MMHG | HEART RATE: 80 BPM | TEMPERATURE: 97.8 F | OXYGEN SATURATION: 97 % | SYSTOLIC BLOOD PRESSURE: 124 MMHG

## 2025-08-18 DIAGNOSIS — G47.9 SLEEP DISTURBANCE: ICD-10-CM

## 2025-08-18 DIAGNOSIS — E11.9 TYPE 2 DIABETES MELLITUS WITHOUT COMPLICATION, WITHOUT LONG-TERM CURRENT USE OF INSULIN: ICD-10-CM

## 2025-08-18 DIAGNOSIS — D11.9 WARTHIN TUMOR: ICD-10-CM

## 2025-08-18 DIAGNOSIS — T23.261A PARTIAL THICKNESS BURN OF BACK OF RIGHT HAND, INITIAL ENCOUNTER: ICD-10-CM

## 2025-08-18 DIAGNOSIS — I48.0 PAROXYSMAL ATRIAL FIBRILLATION (MULTI): Primary | ICD-10-CM

## 2025-08-18 DIAGNOSIS — M17.11 PRIMARY OSTEOARTHRITIS OF RIGHT KNEE: ICD-10-CM

## 2025-08-18 LAB — POC HEMOGLOBIN A1C: 5.2 % (ref 4.2–6.5)

## 2025-08-18 PROCEDURE — 1159F MED LIST DOCD IN RCRD: CPT | Performed by: FAMILY MEDICINE

## 2025-08-18 PROCEDURE — 83036 HEMOGLOBIN GLYCOSYLATED A1C: CPT | Performed by: FAMILY MEDICINE

## 2025-08-18 PROCEDURE — 1124F ACP DISCUSS-NO DSCNMKR DOCD: CPT | Performed by: FAMILY MEDICINE

## 2025-08-18 PROCEDURE — 3078F DIAST BP <80 MM HG: CPT | Performed by: FAMILY MEDICINE

## 2025-08-18 PROCEDURE — 3074F SYST BP LT 130 MM HG: CPT | Performed by: FAMILY MEDICINE

## 2025-08-18 PROCEDURE — 3044F HG A1C LEVEL LT 7.0%: CPT | Performed by: FAMILY MEDICINE

## 2025-08-18 PROCEDURE — 1125F AMNT PAIN NOTED PAIN PRSNT: CPT | Performed by: FAMILY MEDICINE

## 2025-08-18 PROCEDURE — 99214 OFFICE O/P EST MOD 30 MIN: CPT | Mod: 25 | Performed by: FAMILY MEDICINE

## 2025-08-18 PROCEDURE — 1160F RVW MEDS BY RX/DR IN RCRD: CPT | Performed by: FAMILY MEDICINE

## 2025-08-18 RX ORDER — MIRTAZAPINE 15 MG/1
15 TABLET, FILM COATED ORAL NIGHTLY
Qty: 30 TABLET | Refills: 2 | Status: SHIPPED | OUTPATIENT
Start: 2025-08-18 | End: 2025-11-16

## 2025-08-18 ASSESSMENT — ENCOUNTER SYMPTOMS
OCCASIONAL FEELINGS OF UNSTEADINESS: 0
LOSS OF SENSATION IN FEET: 0
DEPRESSION: 0

## 2025-08-18 ASSESSMENT — PAIN SCALES - GENERAL: PAINLEVEL_OUTOF10: 6

## 2025-08-18 ASSESSMENT — PATIENT HEALTH QUESTIONNAIRE - PHQ9
2. FEELING DOWN, DEPRESSED OR HOPELESS: NOT AT ALL
SUM OF ALL RESPONSES TO PHQ9 QUESTIONS 1 & 2: 0
1. LITTLE INTEREST OR PLEASURE IN DOING THINGS: NOT AT ALL

## 2025-08-19 ENCOUNTER — TELEPHONE (OUTPATIENT)
Dept: PRIMARY CARE | Facility: CLINIC | Age: 75
End: 2025-08-19
Payer: MEDICARE

## 2025-08-22 ENCOUNTER — APPOINTMENT (OUTPATIENT)
Dept: OTOLARYNGOLOGY | Facility: CLINIC | Age: 75
End: 2025-08-22
Payer: MEDICARE

## 2025-08-25 ENCOUNTER — TELEPHONE (OUTPATIENT)
Dept: PRIMARY CARE | Facility: CLINIC | Age: 75
End: 2025-08-25
Payer: MEDICARE

## 2025-08-25 DIAGNOSIS — G47.9 SLEEP DISTURBANCE: Primary | ICD-10-CM

## 2025-08-26 RX ORDER — HYDROXYZINE HYDROCHLORIDE 25 MG/1
25-50 TABLET, FILM COATED ORAL NIGHTLY PRN
Qty: 60 TABLET | Refills: 2 | Status: SHIPPED | OUTPATIENT
Start: 2025-08-26 | End: 2025-09-25

## (undated) DEVICE — STRIP, SKIN CLOSURE, STERI STRIP, REINFORCED, 0.5 X 4 IN

## (undated) DEVICE — Device

## (undated) DEVICE — GLOVE, SURGICAL, PROTEXIS PI MICRO, 7.5, PF, LF

## (undated) DEVICE — CORD, CAUTERY, BIOPOLAR FORCEP, 12FT

## (undated) DEVICE — ELECTRODE, INSULATED BLADE, EDGE, W/ 2.75 SLEEVE

## (undated) DEVICE — DRESSING, ISLAND, TELFA, 4 X 5 IN

## (undated) DEVICE — MARKER, SKIN, DUAL TIP INK W/9 LABEL AND REMOVABLE TIME OUT SLEEVE

## (undated) DEVICE — STAPLER, SKIN PROXIMATE, 35 WIDE

## (undated) DEVICE — DRESSING, TRANSPARENT, TEGADERM, 2-3/8 X 2-3/4 IN

## (undated) DEVICE — DRAPE, PAD, INSTRUMENT, MAGNETIC, MEDIUM, 10 X 16 IN, DISPOSABLE

## (undated) DEVICE — LUBRICANT, ELECTROLUBE, F/ELECTRODE TIPS

## (undated) DEVICE — DRESSING, NON-ADHERENT, TELFA, OUCHLESS, 3 X 8 IN, STERILE

## (undated) DEVICE — ADHESIVE, SKIN, MASTISOL, 2/3 CC VIAL

## (undated) DEVICE — APPLIER, LIGACLIP, MULTIPLE, SMALL 9-3/8IN

## (undated) DEVICE — PREP TRAY, VAGINAL

## (undated) DEVICE — SPONGE, DISSECTOR, PEANUT, 3/8, STERILE 5 FOAM HOLDER"

## (undated) DEVICE — SUTURE, SILK, 3-0, 30 IN, BR SH, BLACK

## (undated) DEVICE — SUTURE, MONOCRYLIC, 4-0, P3, MONO 18

## (undated) DEVICE — STAY SET, SURGICAL, 5MM BLUNT HOOK, 8/PK

## (undated) DEVICE — PROBE, PRASS, STANDARD

## (undated) DEVICE — TOWEL, SURGICAL, NEURO, O/R, 16 X 26, BLUE, STERILE

## (undated) DEVICE — ELECTRODE, PAIRED SUBDERMAL OTO

## (undated) DEVICE — DRAPE, INSTRUMENT, W/POUCH, STERI DRAPE, 7 X 11 IN, DISPOSABLE, STERILE

## (undated) DEVICE — SUTURE, VICRYL, 3-0,18 IN, SH, UNDYED

## (undated) DEVICE — GLOVE, SURGICAL, PROTEXIS PI BLUE W/NEUTHERA, 7.5, PF, LF

## (undated) DEVICE — MARKER, SKIN, REGULAR TIP, W/RULER

## (undated) DEVICE — GLOVE, SURGICAL, PROTEXIS PI W/NEU-THERA, 7.5, PF, LF

## (undated) DEVICE — SUTURE, VICRYL PLUS 3-0, SH, 27IN